# Patient Record
Sex: FEMALE | Race: ASIAN | NOT HISPANIC OR LATINO | Employment: OTHER | URBAN - METROPOLITAN AREA
[De-identification: names, ages, dates, MRNs, and addresses within clinical notes are randomized per-mention and may not be internally consistent; named-entity substitution may affect disease eponyms.]

---

## 2021-12-30 ENCOUNTER — TELEPHONE (OUTPATIENT)
Dept: ORTHOPEDICS | Facility: CLINIC | Age: 71
End: 2021-12-30

## 2022-02-07 ENCOUNTER — OFFICE VISIT (OUTPATIENT)
Dept: INTERNAL MEDICINE | Facility: CLINIC | Age: 72
End: 2022-02-07
Payer: MEDICARE

## 2022-02-07 VITALS
DIASTOLIC BLOOD PRESSURE: 78 MMHG | RESPIRATION RATE: 14 BRPM | SYSTOLIC BLOOD PRESSURE: 128 MMHG | OXYGEN SATURATION: 98 % | HEART RATE: 70 BPM

## 2022-02-07 DIAGNOSIS — Z00.00 HEALTHCARE MAINTENANCE: ICD-10-CM

## 2022-02-07 DIAGNOSIS — I10 HYPERTENSION, UNSPECIFIED TYPE: Primary | ICD-10-CM

## 2022-02-07 PROCEDURE — 99203 OFFICE O/P NEW LOW 30 MIN: CPT | Mod: GC,S$GLB,,

## 2022-02-07 PROCEDURE — 99999 PR PBB SHADOW E&M-EST. PATIENT-LVL III: ICD-10-PCS | Mod: PBBFAC,GC,,

## 2022-02-07 PROCEDURE — 99203 PR OFFICE/OUTPT VISIT, NEW, LEVL III, 30-44 MIN: ICD-10-PCS | Mod: GC,S$GLB,,

## 2022-02-07 PROCEDURE — 99999 PR PBB SHADOW E&M-EST. PATIENT-LVL III: CPT | Mod: PBBFAC,GC,,

## 2022-02-07 RX ORDER — AMLODIPINE BESYLATE 5 MG/1
5 TABLET ORAL DAILY
COMMUNITY
Start: 2022-01-27 | End: 2022-02-07 | Stop reason: SDUPTHER

## 2022-02-07 RX ORDER — AMLODIPINE BESYLATE 5 MG/1
5 TABLET ORAL DAILY
Qty: 90 TABLET | Refills: 1 | Status: SHIPPED | OUTPATIENT
Start: 2022-02-07 | End: 2022-08-01 | Stop reason: SDUPTHER

## 2022-02-07 NOTE — PROGRESS NOTES
"  Subjective     Chief Complaint: "establish care"    History of Present Illness:  Ms. García Mercado is a 71 y.o. female Mrs. Mercado is a 72 y/o F hx of HTN who presents to [facility]. Pt was previously a patient at [facility] for many years. However she recently received new insurance and so could not be seen there anymore. Pt states she is currently in good health. She reports a history of hypertension well controlled on Amlodipine. She checks her BP every day at home. She denies any other current medical problems. Pt has all her records at [facility]; however they do not show up in CareEverywhere.     Pt denies any tobacco, alcohol, or other drug use. She lives at home with her .     Review of Systems   Constitutional: Negative for chills, fever, malaise/fatigue and weight loss.   HENT: Negative for congestion and sore throat.    Eyes: Negative for blurred vision.   Respiratory: Negative for cough and shortness of breath.    Cardiovascular: Negative for chest pain, palpitations and leg swelling.   Gastrointestinal: Negative for abdominal pain, blood in stool, constipation, diarrhea, heartburn, melena, nausea and vomiting.   Genitourinary: Negative for frequency.   Musculoskeletal: Positive for joint pain (L shoulder pain). Negative for myalgias.   Neurological: Negative for dizziness, loss of consciousness, weakness and headaches.   Psychiatric/Behavioral: Negative for depression. The patient is not nervous/anxious.        PAST HISTORY:     Past Medical History:   Diagnosis Date    Hypertension        History reviewed. No pertinent surgical history.    Family History   Problem Relation Age of Onset    Cancer Mother     Hypertension Mother     Diabetes Mother        Social History     Socioeconomic History    Marital status:    Tobacco Use    Smoking status: Never Smoker    Smokeless tobacco: Never Used   Substance and Sexual Activity    Alcohol use: Never    Drug use: Never    Sexual activity: Yes "     Partners: Male       MEDICATIONS & ALLERGIES:     Current Outpatient Medications on File Prior to Visit   Medication Sig    [DISCONTINUED] amLODIPine (NORVASC) 5 MG tablet Take 5 mg by mouth once daily.     No current facility-administered medications on file prior to visit.       Review of patient's allergies indicates:   Allergen Reactions    Shellfish containing products Diarrhea       OBJECTIVE:     Vital Signs:  Vitals:    02/07/22 1610   BP: 128/78   Pulse: 70   Resp: 14   SpO2: 98%       There is no height or weight on file to calculate BMI.     Physical Exam  Vitals and nursing note reviewed.   Constitutional:       General: She is not in acute distress.     Appearance: Normal appearance. She is normal weight. She is not ill-appearing, toxic-appearing or diaphoretic.   HENT:      Head: Normocephalic and atraumatic.      Nose: Nose normal.      Mouth/Throat:      Mouth: Mucous membranes are moist.      Pharynx: Oropharynx is clear.   Eyes:      General: No scleral icterus.     Extraocular Movements: Extraocular movements intact.      Conjunctiva/sclera: Conjunctivae normal.      Pupils: Pupils are equal, round, and reactive to light.   Cardiovascular:      Rate and Rhythm: Normal rate and regular rhythm.      Pulses: Normal pulses.      Heart sounds: Normal heart sounds. No murmur heard.  No friction rub. No gallop.    Pulmonary:      Effort: Pulmonary effort is normal. No respiratory distress.      Breath sounds: Normal breath sounds. No stridor. No wheezing, rhonchi or rales.   Abdominal:      General: Abdomen is flat. Bowel sounds are normal.      Palpations: Abdomen is soft.      Tenderness: There is no abdominal tenderness.   Musculoskeletal:         General: No swelling. Normal range of motion.      Cervical back: Normal range of motion and neck supple.   Skin:     General: Skin is warm and dry.   Neurological:      General: No focal deficit present.      Mental Status: She is alert and oriented  to person, place, and time.   Psychiatric:         Mood and Affect: Mood normal.         Behavior: Behavior normal.         Laboratory  No results found for this or any previous visit (from the past 24 hour(s)).    Diagnostic Results:      Health Maintenance Due   Topic Date Due    Hepatitis C Screening  Never done    Lipid Panel  Never done    TETANUS VACCINE  Never done    Mammogram  Never done    DEXA SCAN  Never done    Colorectal Cancer Screening  Never done    Shingles Vaccine (1 of 2) Never done    Pneumococcal Vaccines (Age 65+) (1 of 1 - PPSV23) Never done         ASSESSMENT & PLAN:     Ms. García Mercado is a 71 y.o. female with hx of hypertension who presents to [facility] care.     Hypertension, unspecified type         - BP well controlled on current regimen. Continue Amlodipine 5mg qd.     Healthcare maintenance         - Pt reports being up to date with screening exams, however I am unable to see her records from [facility]. I will fax a medical records requisition form for her records, in particular for colonoscopy, mammogram, DEXA scan.         - Pt declines TDAP, Pneumococcal, and Shingrix vaccines at this time.         - Pt states she recently had baseline labs drawn at [facility] last year.     Other orders  -     amLODIPine (NORVASC) 5 MG tablet; Take 1 tablet (5 mg total) by mouth once daily.  Dispense: 90 tablet; Refill: 1      RTC in 6 months     Discussed with Dr. Owen  - staff attestation to follow      Skip Ramon MD  Internal Medicine PGY1  [facility]  [address] [place] [address] [id number] [phone]

## 2022-02-07 NOTE — PATIENT INSTRUCTIONS
It was great seeing you in clinic this afternoon.    I will have your records faxed to our office.    I will see you back in clinic in 6 months.

## 2022-02-10 ENCOUNTER — HOSPITAL ENCOUNTER (OUTPATIENT)
Dept: RADIOLOGY | Facility: HOSPITAL | Age: 72
Discharge: HOME | End: 2022-02-10
Attending: PHYSICIAN ASSISTANT
Payer: MEDICARE

## 2022-02-10 ENCOUNTER — OFFICE VISIT (OUTPATIENT)
Dept: ORTHOPEDICS | Facility: CLINIC | Age: 72
End: 2022-02-10
Payer: MEDICARE

## 2022-02-10 VITALS
BODY MASS INDEX: 21.65 KG/M2 | HEART RATE: 92 BPM | TEMPERATURE: 96 F | SYSTOLIC BLOOD PRESSURE: 119 MMHG | WEIGHT: 117.63 LBS | HEIGHT: 62 IN | DIASTOLIC BLOOD PRESSURE: 72 MMHG

## 2022-02-10 DIAGNOSIS — M25.512 ACUTE PAIN OF LEFT SHOULDER: ICD-10-CM

## 2022-02-10 DIAGNOSIS — M25.512 ACUTE PAIN OF LEFT SHOULDER: Primary | ICD-10-CM

## 2022-02-10 PROCEDURE — 99999 PR PBB SHADOW E&M-EST. PATIENT-LVL III: CPT | Mod: PBBFAC,,, | Performed by: PHYSICIAN ASSISTANT

## 2022-02-10 PROCEDURE — 1125F PR PAIN SEVERITY QUANTIFIED, PAIN PRESENT: ICD-10-PCS | Mod: CPTII,S$GLB,, | Performed by: PHYSICIAN ASSISTANT

## 2022-02-10 PROCEDURE — 1100F PTFALLS ASSESS-DOCD GE2>/YR: CPT | Mod: CPTII,S$GLB,, | Performed by: PHYSICIAN ASSISTANT

## 2022-02-10 PROCEDURE — 1160F PR REVIEW ALL MEDS BY PRESCRIBER/CLIN PHARMACIST DOCUMENTED: ICD-10-PCS | Mod: CPTII,S$GLB,, | Performed by: PHYSICIAN ASSISTANT

## 2022-02-10 PROCEDURE — 1159F PR MEDICATION LIST DOCUMENTED IN MEDICAL RECORD: ICD-10-PCS | Mod: CPTII,S$GLB,, | Performed by: PHYSICIAN ASSISTANT

## 2022-02-10 PROCEDURE — 1160F RVW MEDS BY RX/DR IN RCRD: CPT | Mod: CPTII,S$GLB,, | Performed by: PHYSICIAN ASSISTANT

## 2022-02-10 PROCEDURE — 3288F PR FALLS RISK ASSESSMENT DOCUMENTED: ICD-10-PCS | Mod: CPTII,S$GLB,, | Performed by: PHYSICIAN ASSISTANT

## 2022-02-10 PROCEDURE — 3074F PR MOST RECENT SYSTOLIC BLOOD PRESSURE < 130 MM HG: ICD-10-PCS | Mod: CPTII,S$GLB,, | Performed by: PHYSICIAN ASSISTANT

## 2022-02-10 PROCEDURE — 99999 PR PBB SHADOW E&M-EST. PATIENT-LVL III: ICD-10-PCS | Mod: PBBFAC,,, | Performed by: PHYSICIAN ASSISTANT

## 2022-02-10 PROCEDURE — 73030 X-RAY EXAM OF SHOULDER: CPT | Mod: TC,LT

## 2022-02-10 PROCEDURE — 3078F DIAST BP <80 MM HG: CPT | Mod: CPTII,S$GLB,, | Performed by: PHYSICIAN ASSISTANT

## 2022-02-10 PROCEDURE — 3008F PR BODY MASS INDEX (BMI) DOCUMENTED: ICD-10-PCS | Mod: CPTII,S$GLB,, | Performed by: PHYSICIAN ASSISTANT

## 2022-02-10 PROCEDURE — 99203 OFFICE O/P NEW LOW 30 MIN: CPT | Mod: S$GLB,,, | Performed by: PHYSICIAN ASSISTANT

## 2022-02-10 PROCEDURE — 73030 X-RAY EXAM OF SHOULDER: CPT | Mod: 26,LT,, | Performed by: RADIOLOGY

## 2022-02-10 PROCEDURE — 3078F PR MOST RECENT DIASTOLIC BLOOD PRESSURE < 80 MM HG: ICD-10-PCS | Mod: CPTII,S$GLB,, | Performed by: PHYSICIAN ASSISTANT

## 2022-02-10 PROCEDURE — 3288F FALL RISK ASSESSMENT DOCD: CPT | Mod: CPTII,S$GLB,, | Performed by: PHYSICIAN ASSISTANT

## 2022-02-10 PROCEDURE — 1125F AMNT PAIN NOTED PAIN PRSNT: CPT | Mod: CPTII,S$GLB,, | Performed by: PHYSICIAN ASSISTANT

## 2022-02-10 PROCEDURE — 3074F SYST BP LT 130 MM HG: CPT | Mod: CPTII,S$GLB,, | Performed by: PHYSICIAN ASSISTANT

## 2022-02-10 PROCEDURE — 99203 PR OFFICE/OUTPT VISIT, NEW, LEVL III, 30-44 MIN: ICD-10-PCS | Mod: S$GLB,,, | Performed by: PHYSICIAN ASSISTANT

## 2022-02-10 PROCEDURE — 3008F BODY MASS INDEX DOCD: CPT | Mod: CPTII,S$GLB,, | Performed by: PHYSICIAN ASSISTANT

## 2022-02-10 PROCEDURE — 73030 XR SHOULDER COMPLETE 2 OR MORE VIEWS LEFT: ICD-10-PCS | Mod: 26,LT,, | Performed by: RADIOLOGY

## 2022-02-10 PROCEDURE — 1100F PR PT FALLS ASSESS DOC 2+ FALLS/FALL W/INJURY/YR: ICD-10-PCS | Mod: CPTII,S$GLB,, | Performed by: PHYSICIAN ASSISTANT

## 2022-02-10 PROCEDURE — 1159F MED LIST DOCD IN RCRD: CPT | Mod: CPTII,S$GLB,, | Performed by: PHYSICIAN ASSISTANT

## 2022-02-11 ENCOUNTER — PATIENT MESSAGE (OUTPATIENT)
Dept: ORTHOPEDICS | Facility: CLINIC | Age: 72
End: 2022-02-11
Payer: MEDICARE

## 2022-02-23 ENCOUNTER — PATIENT OUTREACH (OUTPATIENT)
Dept: ADMINISTRATIVE | Facility: HOSPITAL | Age: 72
End: 2022-02-23
Payer: MEDICARE

## 2022-02-23 NOTE — PROGRESS NOTES
Health Maintenance Due   Topic Date Due    Hepatitis C Screening  Never done    Lipid Panel  Never done    Mammogram  Never done    DEXA Scan  Never done    Colorectal Cancer Screening  Never done    Shingles Vaccine (1 of 2) Never done    TETANUS VACCINE  11/20/2007    Pneumococcal Vaccines (Age 65+) (1 of 1 - PPSV23) Never done     Triggered LINKS & reconciled immunizations.  Updated Care Everywhere.  Scanned in outside lab results into chart.  Chart review completed.

## 2022-03-10 ENCOUNTER — CLINICAL SUPPORT (OUTPATIENT)
Dept: REHABILITATION | Facility: HOSPITAL | Age: 72
End: 2022-03-10
Payer: MEDICARE

## 2022-03-10 DIAGNOSIS — R68.89 IMPAIRED FUNCTION OF UPPER EXTREMITY: ICD-10-CM

## 2022-03-10 DIAGNOSIS — M25.612 IMPAIRED RANGE OF MOTION OF LEFT SHOULDER: ICD-10-CM

## 2022-03-10 DIAGNOSIS — R29.898 DECREASED STRENGTH OF UPPER EXTREMITY: ICD-10-CM

## 2022-03-10 PROCEDURE — 97161 PT EVAL LOW COMPLEX 20 MIN: CPT | Mod: PO | Performed by: PHYSICAL THERAPIST

## 2022-03-10 NOTE — PLAN OF CARE
[facility]   Physical Therapy Initial Evaluation     Date: 3/10/2022   Name: García Mercado  Clinic Number: [id number]    Therapy Diagnosis:   Encounter Diagnoses   Name Primary?    Impaired range of motion of left shoulder     Impaired function of upper extremity     Decreased strength of upper extremity      Physician: Brady Ravi PA*    Physician Orders: PT Eval and Treat left shoulder  Medical Diagnosis from Referral:   Acute pain of left shoulder [M25.512]    Evaluation Date: 3/10/2022  Authorization Period Expiration: 3//24/2022  Plan of Care Expiration: 6/10/2022  Progress Note Due: 4/10/2022  Visit # / Visits authorized: 1/ 1   FOTO: 1/ 3     Precautions: Standard    Time In: 1455  Time Out: 1545  Total Appointment Time (timed & untimed codes): 50 minutes      SUBJECTIVE       History of current condition: García reports constant pain left shoulder.       Date of onset: 12/24/2021. Patient fell and landed on her outstretched arm in her home. She says that she tripped over her sandle in the AM when first up to walk.   There was immediate pain. The pain improved but persisted. Consulted MD seven weeks later. Presently the pain has improved.and not as intense.   Falls: x1  Pain:  Current 5/10, worst 5/10, best 3/10   Location: left shoulder, anterior and posterior aspects    Description: Sharp  Aggravating Factors: reaching behind her back   Easing Factors: holding the arm close to her side   Sleep: not disturbed     Current Level of Function:   Personal - dressing to put left arm in the sleeve.   Domestic - not limited.   Community - not limited   Occupation - NA   Sports/recreation/fitness - not limited. Patient only walks.   Prior Level of Function: no limitations   Prior Therapy: no   Social History:  lives with their spouse in a single family home.   Occupation: retired     Patients goals: to move the arm better without pain  Imaging, X-Rays 2/10/2022:     Shoulder  complete three views left: There is baseline DJD.  No fracture dislocation bone destruction seen.  No fracture dislocation bone destruction seen        Medical History:   Past Medical History:   Diagnosis Date    Hypertension        Surgical History:   García Mercado  has no past surgical history on file.    Medications:   García has a current medication list which includes the following prescription(s): amlodipine.    Allergies:   Review of patient's allergies indicates:   Allergen Reactions    Shellfish containing products Diarrhea          OBJECTIVE         Objective     Posture: slight forward head, left scapula prominent vertebral border    Scapular Control/Dyskinesis:    Normal / Subtle / Obvious   Left Obvious, abd w/IR, forwared raise     Right Normal      Dermatomes: intact   Palpation: pain left RTC attachment.   Selective Functional Movement Assessment:  FN: functional, non-painful  FP: functional, painful  DP: dysfunctional, painful  DN: dysfunctional, non-painful    Active Cervical Flexion: DN  Active Cervical Extension: DN  Cervical Rotation:  Right:DN  Left: DN  Upper extremity pattern 1:  Right: FN  Left:FP  Upper extremity pattern 2:  Right:FN  Left:DP    Shoulder Range of Motion:   Shoulder  Left  Pain/Dysfunction with Movement    AROM PROM MMT    flexion 120p 145p 3+/5    extension 35p 50p 3+/5    abduction 90p 130p 3+/5p    Internal rotation 60 70 4-/5    ER at 90° abd 50p 70p 3+/5    ER at 0° abd 60p 70p 4-/5        STRENGTH LEFT   Supraspinatus 3+/5      Subscapularis  4-/5       Teres minor  3+/5        Infraspinatus   4-/5                                                            Left   Empty Can (Supraspinatus) Pos    Sifuentes [place] Pos    Crossover Impingment Neg    Lift Off (Subscap) Pos    Belly Press (Subscap) Neg    Speeds Sign  Neg    O'Briens (Labrum) Pos            Limitation/Restriction for FOTO shoulder IE Survey    Therapist reviewed FOTO scores for García Mercado on 3/10/2022.   FOTO  documents entered into EPIC - see Media section.    Limitation Score: 50%         TREATMENT     Total Treatment time (time-based codes) separate from Evaluation: 0 minutes       PATIENT EDUCATION AND HOME EXERCISES     Education provided:   - none     Written Home Exercises Provided: no.     ASSESSMENT     García is a 71 y.o. female referred to outpatient Physical Therapy with a medical diagnosis of  Acute pain of left shoulder   Patient presents with clinical findings of shoulder flexion abduction pain dysfunction, shoulder external rotation pain dysfunction, shoulder extension pain dysfunction. There is corresponding dysfunctional scapula mobility patterns. Clinical testing demonstrated positive impingement signs along possible labral signs and pain from the RTC subscapularis and supraspinatus, teres minor     Patient prognosis is Good.   Patientt will benefit from skilled outpatient Physical Therapy to address the deficits stated above and in the chart below, provide patient /family education, and to maximize patientt's level of independence.     Plan of care discussed with patient: Yes  Patient's spiritual, cultural and educational needs considered and patient is agreeable to the plan of care and goals as stated below:     Anticipated Barriers for therapy: none     Medical Necessity is demonstrated by the following  History  Co-morbidities and personal factors that may impact the plan of care Co-morbidities:   Past Medical History:   Diagnosis Date    Hypertension        Personal Factors:   no deficits     low   Examination  Body Structures and Functions, activity limitations and participation restrictions that may impact the plan of care Body Regions:   left shoulder    Body Systems:    musculoskeletal    Participation Restrictions:   None    Activity limitations:   Learning and applying knowledge  no deficits    General Tasks and Commands  no deficits    Communication  no deficits    Mobility  reaching behind  the back and overhead    Self care  dressing    Domestic Life  no deficits    Interactions/Relationships  no deficits    Life Areas  employment  no deficits    Community and Social Life  community life  recreation and leisure         low   Clinical Presentation stable and uncomplicated low   Decision Making/ Complexity Score: low     Goals:  Shoulder goals to be met:  Short Term Goals: 5 weeks   1. Pts pain intensity reduced 20 - 40% for improved quality of life  ONGOING  2. Pt to demonstrate functional shoulder pattern 1 (extension, IR,horiz add) w/o pain for improved functional mobility ONGOING  3. Pt to increase strength by 1/2 grade in all glenohumeral and periscapular muscles for improved functional mobility. ONGOING  4. Pt to to exhibit correct exercise patterns of movement for return demonstration of HEP ONGOING  5. Pt to demonstrate shoulder pattern 2 w/o pain for restoring functional movement pattern ONGOING    Long Term Goals: 10 weeks   1. Pt to perform UE pattern 2 movement to achieve functional overhead reach without pain  ONGOING  2. Pt to achieve increase active shoulder mobility by 15 to 20 degrees in flexion, abduction, extension and external rotation to restore functional mobility pattern and shoulder movement behind the back and overhead. ONGOING  3. Pt to achieve increased thoracic extension / rotation to 50 degrees mobility to enhance functional shoulder mobility ONGOING  4. Pt to demonstrate independence with HEP for self management. ONGOING  5. Pt to achieve increased glenohumeral and periscapular muscle strength by one grade to restore functional left UE mobility. ONGOING    PLAN   Plan of care Certification: 3/10/2022 to 6/10/2022.    Outpatient Physical Therapy 2 times weekly for 10 weeks to include the following interventions: Manual Therapy, Neuromuscular Re-ed, Patient Education and Therapeutic Exercise. Modalities PRADRIA Castelan, PT      I CERTIFY THE NEED FOR THESE SERVICES  FURNISHED UNDER THIS PLAN OF TREATMENT AND WHILE UNDER MY CARE   Physician's comments:     Physician's Signature: ___________________________________________________

## 2022-03-10 NOTE — PROGRESS NOTES
[facility]   Physical Therapy Initial Evaluation     Date: 3/10/2022   Name: García Mercado  Clinic Number: [id number]    Therapy Diagnosis:   Encounter Diagnoses   Name Primary?    Impaired range of motion of left shoulder     Impaired function of upper extremity     Decreased strength of upper extremity      Physician: Brady Ravi PA*    Physician Orders: PT Eval and Treat left shoulder  Medical Diagnosis from Referral:   Acute pain of left shoulder [M25.512]    Evaluation Date: 3/10/2022  Authorization Period Expiration: 3//24/2022  Plan of Care Expiration: 6/10/2022  Progress Note Due: 4/10/2022  Visit # / Visits authorized: 1/ 1   FOTO: 1/ 3     Precautions: Standard    Time In: 1455  Time Out: 1545  Total Appointment Time (timed & untimed codes): 50 minutes      SUBJECTIVE       History of current condition: García reports constant pain left shoulder.       Date of onset: 12/24/2021. Patient fell and landed on her outstretched arm in her home. She says that she tripped over her sandle in the AM when first up to walk.   There was immediate pain. The pain improved but persisted. Consulted MD seven weeks later. Presently the pain has improved.and not as intense.   Falls: x1  Pain:  Current 5/10, worst 5/10, best 3/10   Location: left shoulder, anterior and posterior aspects    Description: Sharp  Aggravating Factors: reaching behind her back   Easing Factors: holding the arm close to her side   Sleep: not disturbed     Current Level of Function:   Personal - dressing to put left arm in the sleeve.   Domestic - not limited.   Community - not limited   Occupation - NA   Sports/recreation/fitness - not limited. Patient only walks.   Prior Level of Function: no limitations   Prior Therapy: no   Social History:  lives with their spouse in a single family home.   Occupation: retired     Patients goals: to move the arm better without pain  Imaging, X-Rays 2/10/2022:     Shoulder  complete three views left: There is baseline DJD.  No fracture dislocation bone destruction seen.  No fracture dislocation bone destruction seen        Medical History:   Past Medical History:   Diagnosis Date    Hypertension        Surgical History:   García Mercado  has no past surgical history on file.    Medications:   García has a current medication list which includes the following prescription(s): amlodipine.    Allergies:   Review of patient's allergies indicates:   Allergen Reactions    Shellfish containing products Diarrhea          OBJECTIVE         Objective     Posture: slight forward head, left scapula prominent vertebral border    Scapular Control/Dyskinesis:    Normal / Subtle / Obvious   Left Obvious, abd w/IR, forwared raise     Right Normal      Dermatomes: intact   Palpation: pain left RTC attachment.   Selective Functional Movement Assessment:  FN: functional, non-painful  FP: functional, painful  DP: dysfunctional, painful  DN: dysfunctional, non-painful    Active Cervical Flexion: DN  Active Cervical Extension: DN  Cervical Rotation:  Right:DN  Left: DN  Upper extremity pattern 1:  Right: FN  Left:FP  Upper extremity pattern 2:  Right:FN  Left:DP    Shoulder Range of Motion:   Shoulder  Left  Pain/Dysfunction with Movement    AROM PROM MMT    flexion 120p 145p 3+/5    extension 35p 50p 3+/5    abduction 90p 130p 3+/5p    Internal rotation 60 70 4-/5    ER at 90° abd 50p 70p 3+/5    ER at 0° abd 60p 70p 4-/5        STRENGTH LEFT   Supraspinatus 3+/5      Subscapularis  4-/5       Teres minor  3+/5        Infraspinatus   4-/5                                                            Left   Empty Can (Supraspinatus) Pos    Sifuentes [place] Pos    Crossover Impingment Neg    Lift Off (Subscap) Pos    Belly Press (Subscap) Neg    Speeds Sign  Neg    O'Briens (Labrum) Pos            Limitation/Restriction for FOTO shoulder IE Survey    Therapist reviewed FOTO scores for García Mercado on 3/10/2022.   FOTO  documents entered into EPIC - see Media section.    Limitation Score: 50%         TREATMENT     Total Treatment time (time-based codes) separate from Evaluation: 0 minutes       PATIENT EDUCATION AND HOME EXERCISES     Education provided:   - none     Written Home Exercises Provided: no.     ASSESSMENT     García is a 71 y.o. female referred to outpatient Physical Therapy with a medical diagnosis of  Acute pain of left shoulder   Patient presents with clinical findings of shoulder flexion abduction pain dysfunction, shoulder external rotation pain dysfunction, shoulder extension pain dysfunction. There is corresponding dysfunctional scapula mobility patterns. Clinical testing demonstrated positive impingement signs along possible labral signs and pain from the RTC subscapularis and supraspinatus, teres minor     Patient prognosis is Good.   Patientt will benefit from skilled outpatient Physical Therapy to address the deficits stated above and in the chart below, provide patient /family education, and to maximize patientt's level of independence.     Plan of care discussed with patient: Yes  Patient's spiritual, cultural and educational needs considered and patient is agreeable to the plan of care and goals as stated below:     Anticipated Barriers for therapy: none     Medical Necessity is demonstrated by the following  History  Co-morbidities and personal factors that may impact the plan of care Co-morbidities:   Past Medical History:   Diagnosis Date    Hypertension        Personal Factors:   no deficits     low   Examination  Body Structures and Functions, activity limitations and participation restrictions that may impact the plan of care Body Regions:   left shoulder    Body Systems:    musculoskeletal    Participation Restrictions:   None    Activity limitations:   Learning and applying knowledge  no deficits    General Tasks and Commands  no deficits    Communication  no deficits    Mobility  reaching behind  the back and overhead    Self care  dressing    Domestic Life  no deficits    Interactions/Relationships  no deficits    Life Areas  employment  no deficits    Community and Social Life  community life  recreation and leisure         low   Clinical Presentation stable and uncomplicated low   Decision Making/ Complexity Score: low     Goals:  Shoulder goals to be met:  Short Term Goals: 5 weeks   1. Pts pain intensity reduced 20 - 40% for improved quality of life  ONGOING  2. Pt to demonstrate functional shoulder pattern 1 (extension, IR,horiz add) w/o pain for improved functional mobility ONGOING  3. Pt to increase strength by 1/2 grade in all glenohumeral and periscapular muscles for improved functional mobility. ONGOING  4. Pt to to exhibit correct exercise patterns of movement for return demonstration of HEP ONGOING  5. Pt to demonstrate shoulder pattern 2 w/o pain for restoring functional movement pattern ONGOING    Long Term Goals: 10 weeks   1. Pt to perform UE pattern 2 movement to achieve functional overhead reach without pain  ONGOING  2. Pt to achieve increase active shoulder mobility by 15 to 20 degrees in flexion, abduction, extension and external rotation to restore functional mobility pattern and shoulder movement behind the back and overhead. ONGOING  3. Pt to achieve increased thoracic extension / rotation to 50 degrees mobility to enhance functional shoulder mobility ONGOING  4. Pt to demonstrate independence with HEP for self management. ONGOING  5. Pt to achieve increased glenohumeral and periscapular muscle strength by one grade to restore functional left UE mobility. ONGOING    PLAN   Plan of care Certification: 3/10/2022 to 6/10/2022.    Outpatient Physical Therapy 2 times weekly for 10 weeks to include the following interventions: Manual Therapy, Neuromuscular Re-ed, Patient Education and Therapeutic Exercise. Modalities PRADRIA Castelan, PT      I CERTIFY THE NEED FOR THESE SERVICES  FURNISHED UNDER THIS PLAN OF TREATMENT AND WHILE UNDER MY CARE   Physician's comments:     Physician's Signature: ___________________________________________________

## 2022-03-16 ENCOUNTER — CLINICAL SUPPORT (OUTPATIENT)
Dept: REHABILITATION | Facility: HOSPITAL | Age: 72
End: 2022-03-16
Payer: MEDICARE

## 2022-03-16 DIAGNOSIS — R29.898 DECREASED STRENGTH OF UPPER EXTREMITY: ICD-10-CM

## 2022-03-16 DIAGNOSIS — R68.89 IMPAIRED FUNCTION OF UPPER EXTREMITY: ICD-10-CM

## 2022-03-16 DIAGNOSIS — M25.612 IMPAIRED RANGE OF MOTION OF LEFT SHOULDER: Primary | ICD-10-CM

## 2022-03-16 PROCEDURE — 97110 THERAPEUTIC EXERCISES: CPT | Mod: PO

## 2022-03-16 NOTE — PROGRESS NOTES
"  Physical Therapy Treatment Note     Name: García Mercado  Clinic Number: [id number]    Therapy Diagnosis:   Encounter Diagnoses   Name Primary?    Impaired range of motion of left shoulder Yes    Impaired function of upper extremity     Decreased strength of upper extremity      Physician: Brady Ravi PA*    Visit Date: 3/16/2022  Physician Orders: PT Eval and Treat left shoulder  Medical Diagnosis from Referral:   Acute pain of left shoulder [M25.512]     Evaluation Date: 3/10/2022  Authorization Period Expiration: 3//24/2022  Plan of Care Expiration: 6/10/2022  Progress Note Due: 4/10/2022  Visit # / Visits authorized: 2/5  FOTO: 1/ 3        Time In: 1447  Time Out: 1530  Total Billable Time: 48 minutes    Precautions: Standard    Subjective     Pt reports: "its still the same."  She was compliant with home exercise program.  Response to previous treatment: IE completed  Functional change: ongoing    Pain: 3/10  Location: left shoulder     Objective     García received therapeutic exercises to develop strength, endurance, ROM, flexibility and posture for 38 minutes including:  +UBE fwd/bkwd x5mins total. L1  +pulleys for flexion in sitting x5mins  +supine cane B shoulder flexion 10x10   +Sitting table slides for flexion and ABD 10x10  +Landmines using dowel, turned slightly into scaption x30  +AAROM supine flexion with dowel x20, able to complete w/B arms in ER (thumbs out)  +shoulder rolls bkwds x20      García received cold pack for 10 minutes to L shoulder to decrease pain and inflammation.      Home Exercises Provided and Patient Education Provided     Education provided:   - cueing and demo     Written Home Exercises Provided: Patient instructed to cont prior HEP.  Exercises were reviewed and García was able to demonstrate them prior to the end of the session.  García demonstrated good  understanding of the education provided.     See EMR under Patient Instructions for exercises provided prior " visit.    Assessment   Pt with some discomfort during a few of today's activities. Attempted to decrease this pain with positioning and posture changes as able. She continues to show a lot of upper trap compensation likely increasing the shoulder impingement she came to therapy with.     García Is progressing well towards her goals.   Pt prognosis is Good.     Pt will continue to benefit from skilled outpatient physical therapy to address the deficits listed in the problem list box on initial evaluation, provide pt/family education and to maximize pt's level of independence in the home and community environment.     Pt's spiritual, cultural and educational needs considered and pt agreeable to plan of care and goals.     Anticipated barriers to physical therapy: none    Goals:  Shoulder goals to be met:  Short Term Goals: 5 weeks   1. Pts pain intensity reduced 20 - 40% for improved quality of life  ONGOING  2. Pt to demonstrate functional shoulder pattern 1 (extension, IR,horiz add) w/o pain for improved functional mobility ONGOING  3. Pt to increase strength by 1/2 grade in all glenohumeral and periscapular muscles for improved functional mobility. ONGOING  4. Pt to to exhibit correct exercise patterns of movement for return demonstration of HEP ONGOING  5. Pt to demonstrate shoulder pattern 2 w/o pain for restoring functional movement pattern ONGOING     Long Term Goals: 10 weeks   1. Pt to perform UE pattern 2 movement to achieve functional overhead reach without pain  ONGOING  2. Pt to achieve increase active shoulder mobility by 15 to 20 degrees in flexion, abduction, extension and external rotation to restore functional mobility pattern and shoulder movement behind the back and overhead. ONGOING  3. Pt to achieve increased thoracic extension / rotation to 50 degrees mobility to enhance functional shoulder mobility ONGOING  4. Pt to demonstrate independence with HEP for self management. ONGOING  5. Pt to achieve  increased glenohumeral and periscapular muscle strength by one grade to restore functional left UE mobility. ONGOING     PLAN   Plan of care Certification: 3/10/2022 to 6/10/2022.     Outpatient Physical Therapy 2 times weekly for 10 weeks to include the following interventions: Manual Therapy, Neuromuscular Re-ed, Patient Education and Therapeutic Exercise. Modalities PRN    Camryn Blackman, PT

## 2022-03-25 ENCOUNTER — CLINICAL SUPPORT (OUTPATIENT)
Dept: REHABILITATION | Facility: HOSPITAL | Age: 72
End: 2022-03-25
Payer: MEDICARE

## 2022-03-25 DIAGNOSIS — R29.898 DECREASED STRENGTH OF UPPER EXTREMITY: ICD-10-CM

## 2022-03-25 DIAGNOSIS — R68.89 IMPAIRED FUNCTION OF UPPER EXTREMITY: ICD-10-CM

## 2022-03-25 DIAGNOSIS — M25.612 IMPAIRED RANGE OF MOTION OF LEFT SHOULDER: Primary | ICD-10-CM

## 2022-03-25 PROCEDURE — 97110 THERAPEUTIC EXERCISES: CPT | Mod: PO,CQ

## 2022-03-25 NOTE — PROGRESS NOTES
"  Physical Therapy Treatment Note     Name: García Mercado  Clinic Number: [id number]    Therapy Diagnosis:   Encounter Diagnoses   Name Primary?    Impaired range of motion of left shoulder Yes    Impaired function of upper extremity     Decreased strength of upper extremity      Physician: Brady Ravi PA*    Visit Date: 3/25/2022  Physician Orders: PT Eval and Treat left shoulder  Medical Diagnosis from Referral:   Acute pain of left shoulder [M25.512]     Evaluation Date: 3/10/2022  Authorization Period Expiration: 3//24/2022  Plan of Care Expiration: 6/10/2022  Progress Note Due: 4/10/2022  Visit # / Visits authorized: 2/5  FOTO: 1/ 3        Time In: 1545  Time Out: 1630  Total Billable Time: 45 minutes    Precautions: Standard    Subjective     Pt reports: she still has L shoulder pain.   She was compliant with home exercise program.  Response to previous treatment: Felt fine.   Functional change: ongoing    Pain: did not rate /10  Location: left shoulder     Objective     García received therapeutic exercises to develop strength, endurance, ROM, flexibility and posture for 38 minutes including:    +UBE fwd/bkwd x6mins total. L1  +pulleys for flexion in sitting x5mins  +supine cane B shoulder flexion 10x10   +Sitting table slides for flexion and ABD 10x10  +Landmines using dowel, turned slightly into scaption x30  +AAROM supine flexion with dowel x20, able to complete w/B arms in ER (thumbs out)  +shoulder rolls bkwds x20   Seated scap squeeze's x 20 3"   Supine serratus punches x 20   Supine horizontal abduction YTB x 20           Home Exercises Provided and Patient Education Provided     Education provided:   - cueing and demo     Written Home Exercises Provided: Patient instructed to cont prior HEP.  Exercises were reviewed and García was able to demonstrate them prior to the end of the session.  García demonstrated good  understanding of the education provided.     See EMR under Patient Instructions for " exercises provided prior visit.    Assessment   New exercises were added to pt's therapeutic exercise regimen.  Pt was able to complete the above exercises with reports of increased pain.  Pt still has yet to see any change's in her shoulder pain since beginning PT. Will continue to monitor and progress pt within her tolerance.      García Is progressing well towards her goals.   Pt prognosis is Good.     Pt will continue to benefit from skilled outpatient physical therapy to address the deficits listed in the problem list box on initial evaluation, provide pt/family education and to maximize pt's level of independence in the home and community environment.     Pt's spiritual, cultural and educational needs considered and pt agreeable to plan of care and goals.     Anticipated barriers to physical therapy: none    Goals:  Shoulder goals to be met:  Short Term Goals: 5 weeks   1. Pts pain intensity reduced 20 - 40% for improved quality of life  ONGOING  2. Pt to demonstrate functional shoulder pattern 1 (extension, IR,horiz add) w/o pain for improved functional mobility ONGOING  3. Pt to increase strength by 1/2 grade in all glenohumeral and periscapular muscles for improved functional mobility. ONGOING  4. Pt to to exhibit correct exercise patterns of movement for return demonstration of HEP ONGOING  5. Pt to demonstrate shoulder pattern 2 w/o pain for restoring functional movement pattern ONGOING     Long Term Goals: 10 weeks   1. Pt to perform UE pattern 2 movement to achieve functional overhead reach without pain  ONGOING  2. Pt to achieve increase active shoulder mobility by 15 to 20 degrees in flexion, abduction, extension and external rotation to restore functional mobility pattern and shoulder movement behind the back and overhead. ONGOING  3. Pt to achieve increased thoracic extension / rotation to 50 degrees mobility to enhance functional shoulder mobility ONGOING  4. Pt to demonstrate independence with HEP  for self management. ONGOING  5. Pt to achieve increased glenohumeral and periscapular muscle strength by one grade to restore functional left UE mobility. ONGOING     PLAN   Plan of care Certification: 3/10/2022 to 6/10/2022.     Outpatient Physical Therapy 2 times weekly for 10 weeks to include the following interventions: Manual Therapy, Neuromuscular Re-ed, Patient Education and Therapeutic Exercise. Modalities PRN    Brown Hernandez, PTA

## 2022-03-31 ENCOUNTER — CLINICAL SUPPORT (OUTPATIENT)
Dept: REHABILITATION | Facility: HOSPITAL | Age: 72
End: 2022-03-31
Payer: MEDICARE

## 2022-03-31 DIAGNOSIS — M25.612 IMPAIRED RANGE OF MOTION OF LEFT SHOULDER: Primary | ICD-10-CM

## 2022-03-31 DIAGNOSIS — R29.898 DECREASED STRENGTH OF UPPER EXTREMITY: ICD-10-CM

## 2022-03-31 DIAGNOSIS — R68.89 IMPAIRED FUNCTION OF UPPER EXTREMITY: ICD-10-CM

## 2022-03-31 PROCEDURE — 97110 THERAPEUTIC EXERCISES: CPT | Mod: PO

## 2022-03-31 NOTE — PROGRESS NOTES
Physical Therapy Treatment Note     Name: García Mercado  Clinic Number: [id number]    Therapy Diagnosis:   Encounter Diagnoses   Name Primary?    Impaired range of motion of left shoulder Yes    Impaired function of upper extremity     Decreased strength of upper extremity      Physician: Brady Ravi PA*    Visit Date: 3/31/2022  Physician Orders: PT Eval and Treat left shoulder  Medical Diagnosis from Referral:   Acute pain of left shoulder [M25.512]     Evaluation Date: 3/10/2022  Authorization Period Expiration: 5/10/2022  Plan of Care Expiration: 6/10/2022  Progress Note Due: 4/10/2022  Visit # / Visits authorized: 4/7  FOTO: 1/ 3        Time In: 1530  Time Out: 1615  Total Billable Time: 45 minutes    Precautions: Standard    Subjective     Pt reports: the shoulder feels the same.   She was compliant with home exercise program.  Response to previous treatment: Felt fine.   Functional change: ongoing    Pain: 2 /10  Location: left shoulder     Objective     García received therapeutic exercises to develop strength, endurance, ROM, flexibility and posture for 45 minutes including:    UBE fwd/bkwd x8mins total L1.5  +serratus wall slides 3x10  pulleys for flexion in sitting x5mins  supine cane B shoulder flexion 10x10   Sitting table slides for flexion and ABD 10x10  Landmines using dowel, turned slightly into scaption x30  AAROM supine flexion with dowel x20, able to complete w/B arms in ER (thumbs out)  shoulder rolls bkwds x20   Supine serratus punches x 20   NP- Supine horizontal abduction YTB x 20            Home Exercises Provided and Patient Education Provided     Education provided:   - cueing and demo     Written Home Exercises Provided: Patient instructed to cont prior HEP.  Exercises were reviewed and García was able to demonstrate them prior to the end of the session.  García demonstrated good  understanding of the education provided.     See EMR under Patient Instructions for exercises provided  prior visit.    Assessment   Pt tolerated session well.   Able to properly scap retract during serratus wall slides and noted fatigue in her shoulders following this exercise.     García Is progressing well towards her goals.   Pt prognosis is Good.     Pt will continue to benefit from skilled outpatient physical therapy to address the deficits listed in the problem list box on initial evaluation, provide pt/family education and to maximize pt's level of independence in the home and community environment.     Pt's spiritual, cultural and educational needs considered and pt agreeable to plan of care and goals.     Anticipated barriers to physical therapy: none    Goals:  Shoulder goals to be met:  Short Term Goals: 5 weeks   1. Pts pain intensity reduced 20 - 40% for improved quality of life  ONGOING  2. Pt to demonstrate functional shoulder pattern 1 (extension, IR,horiz add) w/o pain for improved functional mobility ONGOING  3. Pt to increase strength by 1/2 grade in all glenohumeral and periscapular muscles for improved functional mobility. ONGOING  4. Pt to to exhibit correct exercise patterns of movement for return demonstration of HEP ONGOING  5. Pt to demonstrate shoulder pattern 2 w/o pain for restoring functional movement pattern ONGOING     Long Term Goals: 10 weeks   1. Pt to perform UE pattern 2 movement to achieve functional overhead reach without pain  ONGOING  2. Pt to achieve increase active shoulder mobility by 15 to 20 degrees in flexion, abduction, extension and external rotation to restore functional mobility pattern and shoulder movement behind the back and overhead. ONGOING  3. Pt to achieve increased thoracic extension / rotation to 50 degrees mobility to enhance functional shoulder mobility ONGOING  4. Pt to demonstrate independence with HEP for self management. ONGOING  5. Pt to achieve increased glenohumeral and periscapular muscle strength by one grade to restore functional left UE mobility.  ONGOING     PLAN   Plan of care Certification: 3/10/2022 to 6/10/2022.     Outpatient Physical Therapy 2 times weekly for 10 weeks to include the following interventions: Manual Therapy, Neuromuscular Re-ed, Patient Education and Therapeutic Exercise. Modalities EM Blackman, PT

## 2022-04-04 ENCOUNTER — CLINICAL SUPPORT (OUTPATIENT)
Dept: REHABILITATION | Facility: HOSPITAL | Age: 72
End: 2022-04-04
Payer: MEDICARE

## 2022-04-04 DIAGNOSIS — R68.89 IMPAIRED FUNCTION OF UPPER EXTREMITY: ICD-10-CM

## 2022-04-04 DIAGNOSIS — R29.898 DECREASED STRENGTH OF UPPER EXTREMITY: ICD-10-CM

## 2022-04-04 DIAGNOSIS — M25.612 IMPAIRED RANGE OF MOTION OF LEFT SHOULDER: Primary | ICD-10-CM

## 2022-04-04 PROCEDURE — 97110 THERAPEUTIC EXERCISES: CPT | Mod: PO,CQ

## 2022-04-04 NOTE — PROGRESS NOTES
Physical Therapy Treatment Note     Name: García Mercado  Clinic Number: [id number]    Therapy Diagnosis:   Encounter Diagnoses   Name Primary?    Impaired range of motion of left shoulder Yes    Impaired function of upper extremity     Decreased strength of upper extremity      Physician: Brady Ravi PA*    Visit Date: 4/4/2022  Physician Orders: PT Eval and Treat left shoulder  Medical Diagnosis from Referral:   Acute pain of left shoulder [M25.512]     Evaluation Date: 3/10/2022  Authorization Period Expiration: 5/10/2022  Plan of Care Expiration: 6/10/2022  Progress Note Due: 4/10/2022  Visit # / Visits authorized: 4/7  FOTO: 1/ 3        Time In: 1430  Time Out: 1510  Total Billable Time: 40 minutes    Precautions: Standard    Subjective     Pt reports: the shoulder is feeling a little bit better.    She was compliant with home exercise program.  Response to previous treatment: Felt fine.   Functional change: ongoing    Pain: did not rate /10  Location: left shoulder     Objective     García received therapeutic exercises to develop strength, endurance, ROM, flexibility and posture for 45 minutes including:    UBE fwd/bkwd x8mins total L1.5  serratus wall slides 3x10  pulleys for flexion in sitting x 5 mins  supine cane B shoulder flexion 10x10   Sitting table slides for flexion and ABD 10x10  Landmines using dowel, turned slightly into scaption x30  AAROM supine flexion with dowel x20, able to complete w/B arms in ER (thumbs out)  shoulder rolls bkwds x 30   Supine serratus punches x 30       NP- Supine horizontal abduction YTB x 20            Home Exercises Provided and Patient Education Provided     Education provided:   - cueing and demo     Written Home Exercises Provided: Patient instructed to cont prior HEP.  Exercises were reviewed and García was able to demonstrate them prior to the end of the session.  García demonstrated good  understanding of the education provided.     See EMR under Patient  Instructions for exercises provided prior visit.    Assessment   Pt with noted improvements in her left shoulder pain this afternoon prior to beginning PT treatment.  Pt performed the above exercises with good tolerance requiring some minor cueing on proper exercise form.  Will continue to monitor and progress pt within her tolerance.     García Is progressing well towards her goals.   Pt prognosis is Good.     Pt will continue to benefit from skilled outpatient physical therapy to address the deficits listed in the problem list box on initial evaluation, provide pt/family education and to maximize pt's level of independence in the home and community environment.     Pt's spiritual, cultural and educational needs considered and pt agreeable to plan of care and goals.     Anticipated barriers to physical therapy: none    Goals:  Shoulder goals to be met:  Short Term Goals: 5 weeks   1. Pts pain intensity reduced 20 - 40% for improved quality of life  ONGOING  2. Pt to demonstrate functional shoulder pattern 1 (extension, IR,horiz add) w/o pain for improved functional mobility ONGOING  3. Pt to increase strength by 1/2 grade in all glenohumeral and periscapular muscles for improved functional mobility. ONGOING  4. Pt to to exhibit correct exercise patterns of movement for return demonstration of HEP ONGOING  5. Pt to demonstrate shoulder pattern 2 w/o pain for restoring functional movement pattern ONGOING     Long Term Goals: 10 weeks   1. Pt to perform UE pattern 2 movement to achieve functional overhead reach without pain  ONGOING  2. Pt to achieve increase active shoulder mobility by 15 to 20 degrees in flexion, abduction, extension and external rotation to restore functional mobility pattern and shoulder movement behind the back and overhead. ONGOING  3. Pt to achieve increased thoracic extension / rotation to 50 degrees mobility to enhance functional shoulder mobility ONGOING  4. Pt to demonstrate independence with  HEP for self management. ONGOING  5. Pt to achieve increased glenohumeral and periscapular muscle strength by one grade to restore functional left UE mobility. ONGOING     PLAN   Plan of care Certification: 3/10/2022 to 6/10/2022.     Outpatient Physical Therapy 2 times weekly for 10 weeks to include the following interventions: Manual Therapy, Neuromuscular Re-ed, Patient Education and Therapeutic Exercise. Modalities PRN    Brown Hernandez, PTA

## 2022-04-11 ENCOUNTER — CLINICAL SUPPORT (OUTPATIENT)
Dept: REHABILITATION | Facility: HOSPITAL | Age: 72
End: 2022-04-11
Payer: MEDICARE

## 2022-04-11 DIAGNOSIS — R29.898 DECREASED STRENGTH OF UPPER EXTREMITY: ICD-10-CM

## 2022-04-11 DIAGNOSIS — M25.612 IMPAIRED RANGE OF MOTION OF LEFT SHOULDER: Primary | ICD-10-CM

## 2022-04-11 DIAGNOSIS — R68.89 IMPAIRED FUNCTION OF UPPER EXTREMITY: ICD-10-CM

## 2022-04-11 PROCEDURE — 97110 THERAPEUTIC EXERCISES: CPT | Mod: PO

## 2022-04-11 PROCEDURE — 97140 MANUAL THERAPY 1/> REGIONS: CPT | Mod: PO

## 2022-04-11 NOTE — PROGRESS NOTES
"  Physical Therapy Treatment Note/PROGRESS REPORT     Name: García Mercado  Clinic Number: [id number]    Therapy Diagnosis:   Encounter Diagnoses   Name Primary?    Impaired range of motion of left shoulder Yes    Impaired function of upper extremity     Decreased strength of upper extremity      Physician: Brady Ravi PA*    Visit Date: 4/11/2022  Physician Orders: PT Eval and Treat left shoulder  Medical Diagnosis from Referral:   Acute pain of left shoulder [M25.512]     Evaluation Date: 3/10/2022  Authorization Period Expiration: 5/10/2022  Plan of Care Expiration: 6/10/2022  Progress Note Due: 4/10/2022 completed on 4/11/22    Visit # / Visits authorized: 4/7  FOTO: 2/ 3        Time In: 1444  Time Out: 1530  Total Billable Time: 46minutes    Precautions: Standard    Subjective     Pt reports: "same."    She was compliant with home exercise program.  Response to previous treatment: Felt fine.   Functional change: ongoing    Pain: did not rate /10  Location: left shoulder   Pain:  Current 5/10; 2/10  worst 5/10; 3-4/10 at worst   best 3/10 2/10  Location: left shoulder, anterior and posterior aspects    Description: Sharp  Aggravating Factors: reaching behind her back better  Easing Factors: holding the arm close to her side   Sleep: not disturbed same     Current Level of Function:   Personal - dressing to put left arm in the sleeve. better      Objective   Palpation: pt w/significant joint crepitus during PROM.   Increased tissue tension of pecs.    Shoulder Range of Motion:   Shoulder   Left   Pain/Dysfunction with Movement     AROM PROM MMT  AROM   flexion 120p 145p 3+/5   112 deg   extension 35p 50p 3+/5  42deg    abduction 90p 130p 3+/5p  84 deg   Internal rotation 60 70 4-/5  fxl reach to L1   ER at 90° abd 50p 70p 3+/5  fxl reach to T2 w/pain   ER at 0° abd 60p 70p 4-/5  NT         STRENGTH LEFT   Supraspinatus 3+/5      Subscapularis  4-/5       Teres minor  3+/5        Infraspinatus   4-/5 "       García received therapeutic exercises to develop strength, endurance, ROM, flexibility and posture for 38 minutes including:    UBE fwd/bkwd x8mins total L1.5  NP-serratus wall slides 3x10  NP- pulleys for flexion in sitting x 5 mins  supine cane B shoulder flexion 10x10   Sitting table slides for flexion and ABD 10x10  Landmines using dowel, turned slightly into scaption x30  AAROM supine flexion with dowel x20, able to complete w/B arms in ER (thumbs out)  shoulder rolls bkwds x 30   Supine serratus punches x 30       NP- Supine horizontal abduction YTB x 20      Pt received MT for 8 mins to improve L shoulder ROM and decrease pain.  - PROM for flexion, ABD , scaption and ER      Home Exercises Provided and Patient Education Provided     Education provided:   - cueing and demo     Written Home Exercises Provided: Patient instructed to cont prior HEP.  Exercises were reviewed and García was able to demonstrate them prior to the end of the session.  García demonstrated good  understanding of the education provided.     See EMR under Patient Instructions for exercises provided prior visit.    Assessment   Pt with no significant change in her reported pain levels as compared to start of PT POC. She is happy with her treatment and being able to do some exercises in therapy.  She notes a few subjective improvements however. Seemed to benefit from MT today will reassess at next session to decide whether its beneficial to continue w/manual techs in future sessions.  She remains motivated to participate in skilled PT services to restore her LOF.     García Is progressing well towards her goals.   Pt prognosis is Good.      Limitation/Restriction for FOTO shoulder IE Survey     Therapist reviewed FOTO scores for García Mercado on 4/11/2022.   FOTO documents entered into [employer] - see Media section.     Limitation Score: 46%           Pt will continue to benefit from skilled outpatient physical therapy to address the deficits  listed in the problem list box on initial evaluation, provide pt/family education and to maximize pt's level of independence in the home and community environment.     Pt's spiritual, cultural and educational needs considered and pt agreeable to plan of care and goals.     Anticipated barriers to physical therapy: none    Goals:  Shoulder goals to be met:  Short Term Goals: 5 weeks   1. Pts pain intensity reduced 20 - 40% for improved quality of life ongoing  2. Pt to demonstrate functional shoulder pattern 1 (extension, IR,horiz add) w/o pain for improved functional mobility ongoing  3. Pt to increase strength by 1/2 grade in all glenohumeral and periscapular muscles for improved functional mobility. ongoing  4. Pt to to exhibit correct exercise patterns of movement for return demonstration of HEP ongoing  5. Pt to demonstrate shoulder pattern 2 w/o pain for restoring functional movement pattern ongoing     Long Term Goals: 10 weeks   1. Pt to perform UE pattern 2 movement to achieve functional overhead reach without pain  ONGOING  2. Pt to achieve increase active shoulder mobility by 15 to 20 degrees in flexion, abduction, extension and external rotation to restore functional mobility pattern and shoulder movement behind the back and overhead. ONGOING  3. Pt to achieve increased thoracic extension / rotation to 50 degrees mobility to enhance functional shoulder mobility ONGOING  4. Pt to demonstrate independence with HEP for self management. ONGOING  5. Pt to achieve increased glenohumeral and periscapular muscle strength by one grade to restore functional left UE mobility. ONGOING     PLAN   Plan of care Certification: 3/10/2022 to 6/10/2022.     Outpatient Physical Therapy 2 times weekly for 10 weeks to include the following interventions: Manual Therapy, Neuromuscular Re-ed, Patient Education and Therapeutic Exercise. Modalities PRN    Camryn Blackman, PT

## 2022-04-13 ENCOUNTER — CLINICAL SUPPORT (OUTPATIENT)
Dept: REHABILITATION | Facility: HOSPITAL | Age: 72
End: 2022-04-13
Payer: MEDICARE

## 2022-04-13 DIAGNOSIS — R29.898 DECREASED STRENGTH OF UPPER EXTREMITY: ICD-10-CM

## 2022-04-13 DIAGNOSIS — M25.612 IMPAIRED RANGE OF MOTION OF LEFT SHOULDER: Primary | ICD-10-CM

## 2022-04-13 DIAGNOSIS — R68.89 IMPAIRED FUNCTION OF UPPER EXTREMITY: ICD-10-CM

## 2022-04-13 PROCEDURE — 97140 MANUAL THERAPY 1/> REGIONS: CPT | Mod: PO

## 2022-04-13 PROCEDURE — 97110 THERAPEUTIC EXERCISES: CPT | Mod: PO

## 2022-04-13 NOTE — PROGRESS NOTES
"  Physical Therapy Treatment Note     Name: García Mercado  Clinic Number: [id number]    Therapy Diagnosis:   No diagnosis found.  Physician: Brady Ravi PA*    Visit Date: 4/13/2022  Physician Orders: PT Eval and Treat left shoulder  Medical Diagnosis from Referral:   Acute pain of left shoulder [M25.512]     Evaluation Date: 3/10/2022  Authorization Period Expiration: 5/10/2022  Plan of Care Expiration: 6/10/2022  Progress Note Due: 5/11/22  Visit # / Visits authorized: 6/7  FOTO: 2/ 3        Time In: 3:00 pm  Time Out: 3:40 pm  Total Billable Time: 40 minutes    Precautions: Standard    Subjective     Pt reports: the same as last time    She was compliant with home exercise program.  Response to previous treatment: Felt fine.   Functional change: ongoing    Pain: 2 /10  Location: left shoulder       Objective   Palpation: pt w/significant joint crepitus during PROM.   Increased tissue tension of pecs.    Shoulder Range of Motion:   Shoulder   Left   Pain/Dysfunction with Movement     AROM PROM MMT  AROM   flexion 120p 145p 3+/5   112 deg   extension 35p 50p 3+/5  42deg    abduction 90p 130p 3+/5p  84 deg   Internal rotation 60 70 4-/5  fxl reach to L1   ER at 90° abd 50p 70p 3+/5  fxl reach to T2 w/pain   ER at 0° abd 60p 70p 4-/5  NT         STRENGTH LEFT   Supraspinatus 3+/5      Subscapularis  4-/5       Teres minor  3+/5        Infraspinatus   4-/5       Treatment       García received therapeutic exercises to develop strength, endurance, ROM, flexibility and posture for 32 minutes including:    UBE fwd/bkwd x6 mins total L1.5  pulleys for flexion in sitting x 5 mins  +scap squeezes 20x3" with verbal cues for scapular position  supine cane B shoulder flexion 15x10   AAROM supine flexion with dowel x20, able to complete w/B arms in ER (thumbs out)  Supine serratus punches 2# x30 B  Supine horizontal abduction YTB x 20  shoulder rolls bkwds x 30   Sitting table slides for flexion and ABD 5x15  serratus wall " slides 3x10  Landmines using dowel, turned slightly into scaption x30  Shoulder rows OTB 2x10 B  Shoulder extension OTB 2x10 B    Consider:  SL external rotation  Prone rows  Prone Ts  Prone extension      Pt received MT for 8 mins to improve L shoulder ROM and decrease pain.  - PROM for flexion, ABD , scaption and ER  -GH joint mobs inferior and posterior      Education     Home Exercises Provided and Patient Education Provided     Education provided:   - cueing and demo     Written Home Exercises Provided: Patient instructed to cont prior HEP.  Exercises were reviewed and García was able to demonstrate them prior to the end of the session.  García demonstrated good  understanding of the education provided.     See EMR under Patient Instructions for exercises provided prior visit.    Assessment     Pt arrives today stating that her shoulder is still the same but that the manual therapy performed last visit was helpful. She is able to tolerate all exercises well including newly added activities. She was instructed to complete scap squeezes daily at home as a precursor for prone rows, extensions, and T's. Continue to progress with periscapular strength.     García Is progressing well towards her goals.   Pt prognosis is Good.     Pt will continue to benefit from skilled outpatient physical therapy to address the deficits listed in the problem list box on initial evaluation, provide pt/family education and to maximize pt's level of independence in the home and community environment.     Pt's spiritual, cultural and educational needs considered and pt agreeable to plan of care and goals.     Anticipated barriers to physical therapy: none    Goals:  Shoulder goals to be met:  Short Term Goals: 5 weeks   1. Pts pain intensity reduced 20 - 40% for improved quality of life ongoing  2. Pt to demonstrate functional shoulder pattern 1 (extension, IR,horiz add) w/o pain for improved functional mobility ongoing  3. Pt to increase  strength by 1/2 grade in all glenohumeral and periscapular muscles for improved functional mobility. ongoing  4. Pt to to exhibit correct exercise patterns of movement for return demonstration of HEP ongoing  5. Pt to demonstrate shoulder pattern 2 w/o pain for restoring functional movement pattern ongoing     Long Term Goals: 10 weeks   1. Pt to perform UE pattern 2 movement to achieve functional overhead reach without pain  ONGOING  2. Pt to achieve increase active shoulder mobility by 15 to 20 degrees in flexion, abduction, extension and external rotation to restore functional mobility pattern and shoulder movement behind the back and overhead. ONGOING  3. Pt to achieve increased thoracic extension / rotation to 50 degrees mobility to enhance functional shoulder mobility ONGOING  4. Pt to demonstrate independence with HEP for self management. ONGOING  5. Pt to achieve increased glenohumeral and periscapular muscle strength by one grade to restore functional left UE mobility. ONGOING     PLAN   Plan of care Certification: 3/10/2022 to 6/10/2022.     Outpatient Physical Therapy 2 times weekly for 10 weeks to include the following interventions: Manual Therapy, Neuromuscular Re-ed, Patient Education and Therapeutic Exercise. Modalities EM Watson, PT

## 2022-04-18 ENCOUNTER — CLINICAL SUPPORT (OUTPATIENT)
Dept: REHABILITATION | Facility: HOSPITAL | Age: 72
End: 2022-04-18
Payer: MEDICARE

## 2022-04-18 DIAGNOSIS — R68.89 IMPAIRED FUNCTION OF UPPER EXTREMITY: ICD-10-CM

## 2022-04-18 DIAGNOSIS — R29.898 DECREASED STRENGTH OF UPPER EXTREMITY: ICD-10-CM

## 2022-04-18 DIAGNOSIS — M25.612 IMPAIRED RANGE OF MOTION OF LEFT SHOULDER: Primary | ICD-10-CM

## 2022-04-18 PROCEDURE — 97110 THERAPEUTIC EXERCISES: CPT | Mod: PO,CQ

## 2022-04-18 NOTE — PROGRESS NOTES
"  Physical Therapy Treatment Note     Name: García Mercado  Clinic Number: [id number]    Therapy Diagnosis:   Encounter Diagnoses   Name Primary?    Impaired range of motion of left shoulder Yes    Impaired function of upper extremity     Decreased strength of upper extremity      Physician: Brady Ravi PA*    Visit Date: 4/18/2022  Physician Orders: PT Eval and Treat left shoulder  Medical Diagnosis from Referral:   Acute pain of left shoulder [M25.512]     Evaluation Date: 3/10/2022  Authorization Period Expiration: 5/10/2022  Plan of Care Expiration: 6/10/2022  Progress Note Due: 5/11/22  Visit # / Visits authorized: 7/7  FOTO: 2/ 3        Time In: 1:45 pm  Time Out: 2:25 pm  Total Billable Time: 40 minutes    Precautions: Standard    Subjective     Pt reports: no new complaints or concerns at this time.   She was compliant with home exercise program.  Response to previous treatment: Felt fine.   Functional change: ongoing    Pain:  Did not rate /10  Location: left shoulder       Objective         Treatment       García received therapeutic exercises to develop strength, endurance, ROM, flexibility and posture for 32 minutes including:    UBE fwd/bkwd x8 mins total L1.5  pulleys for flexion in sitting x 5 mins  scap squeezes 20x3" with verbal cues for scapular position  supine cane B shoulder flexion 15x10   AAROM supine flexion with dowel x20, able to complete w/B arms in ER (thumbs out)  Supine serratus punches 2# x30 B   Supine horizontal abduction YTB x 30   shoulder rolls bkwds x 30   Sitting table slides for flexion and ABD 5x15  serratus wall slides 3x10   Landmines using dowel, turned slightly into scaption x30  Shoulder rows OTB 3x10 B  Shoulder extension OTB 3x10 B    Consider:  SL external rotation  Prone rows  Prone Ts  Prone extension      Pt received MT for 0 mins to improve L shoulder ROM and decrease pain.  - PROM for flexion, ABD , scaption and ER  -GH joint mobs inferior and " posterior      Education     Home Exercises Provided and Patient Education Provided     Education provided:   - cueing and demo     Written Home Exercises Provided: Patient instructed to cont prior HEP.  Exercises were reviewed and García was able to demonstrate them prior to the end of the session.  García demonstrated good  understanding of the education provided.     See EMR under Patient Instructions for exercises provided prior visit.    Assessment     Pt was able to tolerate increased repetitions this afternoon on majority of her exercises without reports of increased pain.  Pt required some tactile cueing on proper exercise form during thera band exercises to prevent upper trap hiking.  Will continue to monitor and progress pt within her tolerance.     García Is progressing well towards her goals.   Pt prognosis is Good.     Pt will continue to benefit from skilled outpatient physical therapy to address the deficits listed in the problem list box on initial evaluation, provide pt/family education and to maximize pt's level of independence in the home and community environment.     Pt's spiritual, cultural and educational needs considered and pt agreeable to plan of care and goals.     Anticipated barriers to physical therapy: none    Goals:  Shoulder goals to be met:  Short Term Goals: 5 weeks   1. Pts pain intensity reduced 20 - 40% for improved quality of life ongoing  2. Pt to demonstrate functional shoulder pattern 1 (extension, IR,horiz add) w/o pain for improved functional mobility ongoing  3. Pt to increase strength by 1/2 grade in all glenohumeral and periscapular muscles for improved functional mobility. ongoing  4. Pt to to exhibit correct exercise patterns of movement for return demonstration of HEP ongoing  5. Pt to demonstrate shoulder pattern 2 w/o pain for restoring functional movement pattern ongoing     Long Term Goals: 10 weeks   1. Pt to perform UE pattern 2 movement to achieve functional  overhead reach without pain  ONGOING  2. Pt to achieve increase active shoulder mobility by 15 to 20 degrees in flexion, abduction, extension and external rotation to restore functional mobility pattern and shoulder movement behind the back and overhead. ONGOING  3. Pt to achieve increased thoracic extension / rotation to 50 degrees mobility to enhance functional shoulder mobility ONGOING  4. Pt to demonstrate independence with HEP for self management. ONGOING  5. Pt to achieve increased glenohumeral and periscapular muscle strength by one grade to restore functional left UE mobility. ONGOING     PLAN   Plan of care Certification: 3/10/2022 to 6/10/2022.     Outpatient Physical Therapy 2 times weekly for 10 weeks to include the following interventions: Manual Therapy, Neuromuscular Re-ed, Patient Education and Therapeutic Exercise. Modalities EM Hernandez, PTA

## 2022-04-25 ENCOUNTER — CLINICAL SUPPORT (OUTPATIENT)
Dept: REHABILITATION | Facility: HOSPITAL | Age: 72
End: 2022-04-25
Payer: MEDICARE

## 2022-04-25 DIAGNOSIS — R29.898 DECREASED STRENGTH OF UPPER EXTREMITY: ICD-10-CM

## 2022-04-25 DIAGNOSIS — M25.612 IMPAIRED RANGE OF MOTION OF LEFT SHOULDER: Primary | ICD-10-CM

## 2022-04-25 DIAGNOSIS — R68.89 IMPAIRED FUNCTION OF UPPER EXTREMITY: ICD-10-CM

## 2022-04-25 PROCEDURE — 97110 THERAPEUTIC EXERCISES: CPT | Mod: PO,CQ

## 2022-04-25 NOTE — PROGRESS NOTES
"  Physical Therapy Treatment Note     Name: García Mercado  Clinic Number: [id number]    Therapy Diagnosis:   Encounter Diagnoses   Name Primary?    Impaired range of motion of left shoulder Yes    Impaired function of upper extremity     Decreased strength of upper extremity      Physician: Brady Ravi PA*    Visit Date: 4/25/2022  Physician Orders: PT Eval and Treat left shoulder  Medical Diagnosis from Referral:   Acute pain of left shoulder [M25.512]     Evaluation Date: 3/10/2022  Authorization Period Expiration: 5/10/2022  Plan of Care Expiration: 6/10/2022  Progress Note Due: 5/11/22  Visit # / Visits authorized: 8/13  FOTO: 2/ 3        Time In: 2:30 pm  Time Out: 3:10 pm  Total Billable Time: 40 minutes    Precautions: Standard    Subjective     Pt reports: no new issues or concerns at this time.   She was compliant with home exercise program.  Response to previous treatment: Felt fine.   Functional change: ongoing    Pain:  Did not rate /10  Location: left shoulder       Objective         Treatment       García received therapeutic exercises to develop strength, endurance, ROM, flexibility and posture for 40 minutes including:    UBE fwd/bkwd x8 mins total L1.5  pulleys for flexion in sitting x 5 mins  scap squeezes 20x3" with verbal cues for scapular position  supine cane B shoulder flexion 15x10   AAROM supine flexion with dowel x20, able to complete w/B arms in ER (thumbs out)  Supine serratus punches 3# dowel x30 B   Supine horizontal abduction YTB x 30   shoulder rolls bkwds x 30   Sitting table slides for flexion and ABD 5x15  serratus wall slides 3x10   Landmines using dowel, turned slightly into scaption x30  Shoulder rows OTB 3x10 B  Shoulder extension OTB 3x10 B  Prone extension 3 x 10  Prone rows 3 x 10   Prone Ts 2 x 10    Consider:  SL external rotation    Pt received MT for 0 mins to improve L shoulder ROM and decrease pain.  - PROM for flexion, ABD , scaption and ER  -GH joint mobs " inferior and posterior      Education     Home Exercises Provided and Patient Education Provided     Education provided:   - cueing and demo     Written Home Exercises Provided: Patient instructed to cont prior HEP.  Exercises were reviewed and García was able to demonstrate them prior to the end of the session.  García demonstrated good  understanding of the education provided.     See EMR under Patient Instructions for exercises provided prior visit.    Assessment      Prone exercises were added this afternoon to continue to work on improving pt's L UE strength and L shoulder stability.  Pt with no pain post treatment session.  Will continue to monitor and progress pt within her tolerance.     García Is progressing well towards her goals.   Pt prognosis is Good.     Pt will continue to benefit from skilled outpatient physical therapy to address the deficits listed in the problem list box on initial evaluation, provide pt/family education and to maximize pt's level of independence in the home and community environment.     Pt's spiritual, cultural and educational needs considered and pt agreeable to plan of care and goals.     Anticipated barriers to physical therapy: none    Goals:  Shoulder goals to be met:  Short Term Goals: 5 weeks   1. Pts pain intensity reduced 20 - 40% for improved quality of life ongoing  2. Pt to demonstrate functional shoulder pattern 1 (extension, IR,horiz add) w/o pain for improved functional mobility ongoing  3. Pt to increase strength by 1/2 grade in all glenohumeral and periscapular muscles for improved functional mobility. ongoing  4. Pt to to exhibit correct exercise patterns of movement for return demonstration of HEP ongoing  5. Pt to demonstrate shoulder pattern 2 w/o pain for restoring functional movement pattern ongoing     Long Term Goals: 10 weeks   1. Pt to perform UE pattern 2 movement to achieve functional overhead reach without pain  ONGOING  2. Pt to achieve increase active  shoulder mobility by 15 to 20 degrees in flexion, abduction, extension and external rotation to restore functional mobility pattern and shoulder movement behind the back and overhead. ONGOING  3. Pt to achieve increased thoracic extension / rotation to 50 degrees mobility to enhance functional shoulder mobility ONGOING  4. Pt to demonstrate independence with HEP for self management. ONGOING  5. Pt to achieve increased glenohumeral and periscapular muscle strength by one grade to restore functional left UE mobility. ONGOING     PLAN   Plan of care Certification: 3/10/2022 to 6/10/2022.     Outpatient Physical Therapy 2 times weekly for 10 weeks to include the following interventions: Manual Therapy, Neuromuscular Re-ed, Patient Education and Therapeutic Exercise. Modalities PRN    Brown Hernandez, PTA

## 2022-04-27 ENCOUNTER — CLINICAL SUPPORT (OUTPATIENT)
Dept: REHABILITATION | Facility: HOSPITAL | Age: 72
End: 2022-04-27
Payer: MEDICARE

## 2022-04-27 DIAGNOSIS — M25.612 IMPAIRED RANGE OF MOTION OF LEFT SHOULDER: Primary | ICD-10-CM

## 2022-04-27 DIAGNOSIS — R68.89 IMPAIRED FUNCTION OF UPPER EXTREMITY: ICD-10-CM

## 2022-04-27 DIAGNOSIS — R29.898 DECREASED STRENGTH OF UPPER EXTREMITY: ICD-10-CM

## 2022-04-27 PROCEDURE — 97110 THERAPEUTIC EXERCISES: CPT | Mod: PO,CQ

## 2022-04-27 NOTE — PROGRESS NOTES
"  Physical Therapy Treatment Note     Name: García Mercado  Clinic Number: [id number]    Therapy Diagnosis:   Encounter Diagnoses   Name Primary?    Impaired range of motion of left shoulder Yes    Impaired function of upper extremity     Decreased strength of upper extremity      Physician: Brady Ravi PA*    Visit Date: 4/27/2022  Physician Orders: PT Eval and Treat left shoulder  Medical Diagnosis from Referral:   Acute pain of left shoulder [M25.512]     Evaluation Date: 3/10/2022  Authorization Period Expiration: 5/10/2022  Plan of Care Expiration: 6/10/2022  Progress Note Due: 5/11/22  Visit # / Visits authorized: 9/13  FOTO: 2/ 3        Time In: 3:00 pm  Time Out: 3:40 pm  Total Billable Time: 40 minutes    Precautions: Standard    Subjective     Pt reports: her L shoulder feels a little bit better.    She was compliant with home exercise program.  Response to previous treatment: Felt fine.   Functional change: ongoing    Pain:  Did not rate /10  Location: left shoulder       Objective         Treatment       García received therapeutic exercises to develop strength, endurance, ROM, flexibility and posture for 40 minutes including:    UBE fwd/bkwd x8 mins total L1.5  pulleys for flexion in sitting x 5 mins  scap squeezes 20x3" with verbal cues for scapular position  supine cane B shoulder flexion 15x10   AAROM supine flexion with dowel x20, able to complete w/B arms in ER (thumbs out)  Supine serratus punches 3# dowel x30 B   Supine horizontal abduction RTB x 30   shoulder rolls bkwds x 30   Sitting table slides for flexion and ABD 5x15  serratus wall slides 3x10   Landmines using dowel, turned slightly into scaption x30  Shoulder rows OTB 3x10 B  Shoulder extension OTB 3x10 B  Prone extension 3 x 10  Prone rows 3 x 10   Prone Ts 3 x 10    Consider:  SL external rotation    Pt received MT for 0 mins to improve L shoulder ROM and decrease pain.  - PROM for flexion, ABD , scaption and ER  -GH joint mobs " inferior and posterior      Education     Home Exercises Provided and Patient Education Provided     Education provided:   - cueing and demo     Written Home Exercises Provided: Patient instructed to cont prior HEP.  Exercises were reviewed and García was able to demonstrate them prior to the end of the session.  García demonstrated good  understanding of the education provided.     See EMR under Patient Instructions for exercises provided prior visit.    Assessment      Pt with no pain post treatment session.  Pt performed the above exercises with good tolerance requiring subtle cueing on proper exercise form.  Will continue to monitor and progress pt within her tolerance.     García Is progressing well towards her goals.   Pt prognosis is Good.     Pt will continue to benefit from skilled outpatient physical therapy to address the deficits listed in the problem list box on initial evaluation, provide pt/family education and to maximize pt's level of independence in the home and community environment.     Pt's spiritual, cultural and educational needs considered and pt agreeable to plan of care and goals.     Anticipated barriers to physical therapy: none    Goals:  Shoulder goals to be met:  Short Term Goals: 5 weeks   1. Pts pain intensity reduced 20 - 40% for improved quality of life ongoing  2. Pt to demonstrate functional shoulder pattern 1 (extension, IR,horiz add) w/o pain for improved functional mobility ongoing  3. Pt to increase strength by 1/2 grade in all glenohumeral and periscapular muscles for improved functional mobility. ongoing  4. Pt to to exhibit correct exercise patterns of movement for return demonstration of HEP ongoing  5. Pt to demonstrate shoulder pattern 2 w/o pain for restoring functional movement pattern ongoing     Long Term Goals: 10 weeks   1. Pt to perform UE pattern 2 movement to achieve functional overhead reach without pain  ONGOING  2. Pt to achieve increase active shoulder mobility  by 15 to 20 degrees in flexion, abduction, extension and external rotation to restore functional mobility pattern and shoulder movement behind the back and overhead. ONGOING  3. Pt to achieve increased thoracic extension / rotation to 50 degrees mobility to enhance functional shoulder mobility ONGOING  4. Pt to demonstrate independence with HEP for self management. ONGOING  5. Pt to achieve increased glenohumeral and periscapular muscle strength by one grade to restore functional left UE mobility. ONGOING     PLAN   Plan of care Certification: 3/10/2022 to 6/10/2022.     Outpatient Physical Therapy 2 times weekly for 10 weeks to include the following interventions: Manual Therapy, Neuromuscular Re-ed, Patient Education and Therapeutic Exercise. Modalities PRN    Brown Hernandez, PTA

## 2022-05-02 ENCOUNTER — CLINICAL SUPPORT (OUTPATIENT)
Dept: REHABILITATION | Facility: HOSPITAL | Age: 72
End: 2022-05-02
Payer: MEDICARE

## 2022-05-02 DIAGNOSIS — R29.898 DECREASED STRENGTH OF UPPER EXTREMITY: ICD-10-CM

## 2022-05-02 DIAGNOSIS — R68.89 IMPAIRED FUNCTION OF UPPER EXTREMITY: ICD-10-CM

## 2022-05-02 DIAGNOSIS — M25.612 IMPAIRED RANGE OF MOTION OF LEFT SHOULDER: Primary | ICD-10-CM

## 2022-05-02 PROCEDURE — 97110 THERAPEUTIC EXERCISES: CPT | Mod: PO | Performed by: PHYSICAL THERAPIST

## 2022-05-02 NOTE — PROGRESS NOTES
"  Physical Therapy Treatment Note     Name: García Mercado  Clinic Number: [id number]    Therapy Diagnosis:   Encounter Diagnoses   Name Primary?    Impaired range of motion of left shoulder Yes    Impaired function of upper extremity     Decreased strength of upper extremity      Physician: Brady Ravi PA*    Visit Date: 5/2/2022  Physician Orders: PT Eval and Treat left shoulder  Medical Diagnosis from Referral:   Acute pain of left shoulder [M25.512]     Evaluation Date: 3/10/2022  Authorization Period Expiration: 5/10/2022  Plan of Care Expiration: 6/10/2022  Progress Note Due: 5/11/22  Visit # / Visits authorized: 9/13  FOTO: 2/ 3        Time In: 1435 pm  Time Out: 1520       pm   Total Billable Time: 45 minutes    Precautions: Standard    Subjective     Pt reports:having a little pain in the shoulder. It is better but intermittent. Occurs with movement. .    She was compliant with home exercise program.  Response to previous treatment: felt better. No pain.   Functional change: most difficult thing is reaching behind the back.     Pain:  0 /10 at rest, 2/10 with movement   Location: left shoulder       Objective         Treatment       García received therapeutic exercises to develop strength, endurance, ROM, flexibility and posture for 35 minutes including:    UBE fwd/bkwd x8 mins total L1.5  pulleys for flexion in sitting x 5 mins  scap squeezes 20x3" with verbal cues for scapular position  supine cane B shoulder flexion 15x10   AAROM supine flexion with dowel x20, able to complete w/B arms in ER (thumbs out)  Supine serratus punches 3# dowel x30 B   Supine horizontal abduction RTB x 30   shoulder rolls bkwds x 30   Sitting table slides for flexion and ABD 5x15  serratus wall slides 3x10   Landmines using dowel, turned slightly into scaption x30  Shoulder rows OTB 3x10 B  Shoulder extension OTB 3x10 B    Prone extension 3 x 10  Prone rows 3 x 10   Prone Ts 3 x 10    Consider:  SL external rotation    Pt " received MT for 6 mins to improve L shoulder ROM and decrease pain.  - PROM for flexion, ABD and ER  -GH joint mobs inferior, anterior and posterior grades II / III      Education     Home Exercises Provided and Patient Education Provided     Education provided:   - cueing and demo     Written Home Exercises Provided: Patient instructed to cont prior HEP.  Exercises were reviewed and García was able to demonstrate them prior to the end of the session.  García demonstrated good  understanding of the education provided.     See EMR under Patient Instructions for exercises provided prior visit.    Assessment     The patient completed the routine as noted. She responded to manual intervention joint mobs well. There was some pain with hor add (crossover impingement sign) however after manual distraction with hor add the pain was reduced with movement. She completes the exercises with very little cueing. Progress with AROM for strengthening.      García Is progressing well towards her goals.   Pt prognosis is Good.     Pt will continue to benefit from skilled outpatient physical therapy to address the deficits listed in the problem list box on initial evaluation, provide pt/family education and to maximize pt's level of independence in the home and community environment.     Pt's spiritual, cultural and educational needs considered and pt agreeable to plan of care and goals.     Anticipated barriers to physical therapy: none    Goals:  Shoulder goals to be met:  Short Term Goals: 5 weeks   1. Pts pain intensity reduced 20 - 40% for improved quality of life ongoing  2. Pt to demonstrate functional shoulder pattern 1 (extension, IR,horiz add) w/o pain for improved functional mobility ongoing  3. Pt to increase strength by 1/2 grade in all glenohumeral and periscapular muscles for improved functional mobility. ongoing  4. Pt to to exhibit correct exercise patterns of movement for return demonstration of HEP ongoing  5. Pt to  demonstrate shoulder pattern 2 w/o pain for restoring functional movement pattern ongoing     Long Term Goals: 10 weeks   1. Pt to perform UE pattern 2 movement to achieve functional overhead reach without pain  ONGOING  2. Pt to achieve increase active shoulder mobility by 15 to 20 degrees in flexion, abduction, extension and external rotation to restore functional mobility pattern and shoulder movement behind the back and overhead. ONGOING  3. Pt to achieve increased thoracic extension / rotation to 50 degrees mobility to enhance functional shoulder mobility ONGOING  4. Pt to demonstrate independence with HEP for self management. ONGOING  5. Pt to achieve increased glenohumeral and periscapular muscle strength by one grade to restore functional left UE mobility. ONGOING     PLAN   Plan of care Certification: 3/10/2022 to 6/10/2022.     Outpatient Physical Therapy 2 times weekly for 10 weeks to include the following interventions: Manual Therapy, Neuromuscular Re-ed, Patient Education and Therapeutic Exercise. Modalities PRN    Mac Castelan, PT

## 2022-05-04 ENCOUNTER — CLINICAL SUPPORT (OUTPATIENT)
Dept: REHABILITATION | Facility: HOSPITAL | Age: 72
End: 2022-05-04
Payer: MEDICARE

## 2022-05-04 DIAGNOSIS — M25.612 IMPAIRED RANGE OF MOTION OF LEFT SHOULDER: Primary | ICD-10-CM

## 2022-05-04 DIAGNOSIS — R68.89 IMPAIRED FUNCTION OF UPPER EXTREMITY: ICD-10-CM

## 2022-05-04 DIAGNOSIS — R29.898 DECREASED STRENGTH OF UPPER EXTREMITY: ICD-10-CM

## 2022-05-04 PROCEDURE — 97110 THERAPEUTIC EXERCISES: CPT | Mod: PO | Performed by: PHYSICAL THERAPIST

## 2022-05-04 PROCEDURE — 97140 MANUAL THERAPY 1/> REGIONS: CPT | Mod: PO | Performed by: PHYSICAL THERAPIST

## 2022-05-04 NOTE — PROGRESS NOTES
"  Physical Therapy Treatment Note     Name: García Mercado  Clinic Number: [id number]    Therapy Diagnosis:   Encounter Diagnoses   Name Primary?    Impaired range of motion of left shoulder Yes    Impaired function of upper extremity     Decreased strength of upper extremity      Physician: Brady Ravi PA*    Visit Date: 5/4/2022  Physician Orders: PT Eval and Treat left shoulder  Medical Diagnosis from Referral:   Acute pain of left shoulder [M25.512]     Evaluation Date: 3/10/2022  Authorization Period Expiration: 5/10/2022  Plan of Care Expiration: 6/10/2022  Progress Note Due: 5/11/22  Visit # / Visits authorized: 9/13  FOTO: 2/ 3        Time In: 1507 pm  Time Out: 1600   pm   Total Billable Time: 53 minutes    Precautions: Standard    Subjective     Pt reports: the shoulder is feeling better. No pain.       She was compliant with home exercise program.  Response to previous treatment: better.   Functional change: can reach higher.      Pain:  0 /10 at rest, 2/10 with movement   Location: left shoulder       Objective         Treatment       García received therapeutic exercises to develop strength, endurance, ROM, flexibility and posture for 35 minutes including:      UBE fwd/bkwd x8 mins total L1.5    pulleys for flexion in sitting x 5 mins    SUPINE   supine cane B shoulder flexion x20  AAROM supine flexion with dowel x20, able to complete w/B arms in ER (thumbs out)  Supine serratus punches 3# dowel x30 B   Supine horizontal abduction RTB x 30     Seated scap squeezes 20x3" with verbal cues for scapular position  Seated horiz abd T w/emphasis on scapula depression x20   shoulder rolls bkwds x 30   Sitting table slides for flexion and ABD 5x15    STANDING   serratus wall slides 3x10   Landmines using dowel, turned slightly into scaption x30  Shoulder rows OTB 3x10 B  Shoulder extension OTB 3x10 B    Side lying ER x20     Prone extension 3 x 10  Prone rows 3 x 10   Prone Ts 3 x 10  Prone scapula prot / " retr x30     Consider:  SL external rotation    Pt received MT for 15 mins to improve L shoulder ROM and decrease pain.  -PROM for flexion, ABD and ER  -Prone for PROM moving from pattern 1 to pattern 2 and return   -GH joint mobs inferior, anterior and posterior grades II / III / IIII  -shoulder distraction in flexion at 160, abduction at 90     Education     Home Exercises Provided and Patient Education Provided     Education provided:   - cueing and demo     Written Home Exercises Provided: Patient instructed to cont prior HEP.  Exercises were reviewed and García was able to demonstrate them prior to the end of the session.  García demonstrated good  understanding of the education provided.     See EMR under Patient Instructions for exercises provided prior visit.    Assessment     The patient performed the activities noted. She does experience some pain at the end ranges of flexion and internal rotation.  This is most evident with passive patterning in prone. Overall response has been favorable. Initially there was pain in all planes of movement. This has been greatly reduced. Continue to progress with mobility emphasis.     García Is progressing well towards her goals.   Pt prognosis is Good.     Pt will continue to benefit from skilled outpatient physical therapy to address the deficits listed in the problem list box on initial evaluation, provide pt/family education and to maximize pt's level of independence in the home and community environment.     Pt's spiritual, cultural and educational needs considered and pt agreeable to plan of care and goals.     Anticipated barriers to physical therapy: none    Goals:  Shoulder goals to be met:  Short Term Goals: 5 weeks   1. Pts pain intensity reduced 20 - 40% for improved quality of life ongoing  2. Pt to demonstrate functional shoulder pattern 1 (extension, IR,horiz add) w/o pain for improved functional mobility ongoing  3. Pt to increase strength by 1/2 grade in all  glenohumeral and periscapular muscles for improved functional mobility. ongoing  4. Pt to to exhibit correct exercise patterns of movement for return demonstration of HEP ongoing  5. Pt to demonstrate shoulder pattern 2 w/o pain for restoring functional movement pattern ongoing     Long Term Goals: 10 weeks   1. Pt to perform UE pattern 2 movement to achieve functional overhead reach without pain  ONGOING  2. Pt to achieve increase active shoulder mobility by 15 to 20 degrees in flexion, abduction, extension and external rotation to restore functional mobility pattern and shoulder movement behind the back and overhead. ONGOING  3. Pt to achieve increased thoracic extension / rotation to 50 degrees mobility to enhance functional shoulder mobility ONGOING  4. Pt to demonstrate independence with HEP for self management. ONGOING  5. Pt to achieve increased glenohumeral and periscapular muscle strength by one grade to restore functional left UE mobility. ONGOING     PLAN   Plan of care Certification: 3/10/2022 to 6/10/2022.     Outpatient Physical Therapy 2 times weekly for 10 weeks to include the following interventions: Manual Therapy, Neuromuscular Re-ed, Patient Education and Therapeutic Exercise. Modalities PRN    Mac Castelan, PT

## 2022-05-11 ENCOUNTER — CLINICAL SUPPORT (OUTPATIENT)
Dept: REHABILITATION | Facility: HOSPITAL | Age: 72
End: 2022-05-11
Payer: MEDICARE

## 2022-05-11 DIAGNOSIS — R29.898 DECREASED STRENGTH OF UPPER EXTREMITY: ICD-10-CM

## 2022-05-11 DIAGNOSIS — M25.612 IMPAIRED RANGE OF MOTION OF LEFT SHOULDER: Primary | ICD-10-CM

## 2022-05-11 DIAGNOSIS — R68.89 IMPAIRED FUNCTION OF UPPER EXTREMITY: ICD-10-CM

## 2022-05-11 PROCEDURE — 97110 THERAPEUTIC EXERCISES: CPT | Mod: PO | Performed by: PHYSICAL THERAPIST

## 2022-05-11 PROCEDURE — 97140 MANUAL THERAPY 1/> REGIONS: CPT | Mod: PO | Performed by: PHYSICAL THERAPIST

## 2022-05-11 NOTE — PROGRESS NOTES
"  Physical Therapy Treatment Note     Name: García Mercado  Clinic Number: [id number]    Therapy Diagnosis:   Encounter Diagnoses   Name Primary?    Impaired range of motion of left shoulder Yes    Impaired function of upper extremity     Decreased strength of upper extremity      Physician: Brady Ravi PA*    Visit Date: 5/11/2022  Physician Orders: PT Eval and Treat left shoulder  Medical Diagnosis from Referral:   Acute pain of left shoulder [M25.512]     Evaluation Date: 3/10/2022  Authorization Period Expiration: 5/10/2022  Plan of Care Expiration: 6/10/2022  Progress Note Due: 5/11/22  Visit # / Visits authorized: 10 /13  FOTO: 2/ 3        Time In: 1505 pm  Time Out: 1400   pm   Total Billable Time: 55 minutes    Precautions: Standard    Subjective     Pt reports: the shoulder is feeling better. There is no pain if I don't move the arm but if I reach back is is a slight pain      She was compliant with home exercise program.  Response to previous treatment: felt better.   Functional change: can reach higher.      Pain:  0 /10 at rest, 2/10 with movement   Location: left shoulder       Objective         Treatment       García received therapeutic exercises to develop strength, endurance, ROM, flexibility and posture for 45 minutes including:      UBE fwd/bkwd x8 mins total L1.5    pulleys for flexion in sitting x 5 mins    SUPINE   Active arm raise x20   supine cane B shoulder flexion x20  AAROM supine flexion with dowel x20, able to complete w/B arms in ER (thumbs out)  Supine serratus punches 4# DB x50 LUE   Supine horizontal abduction RTB x 30     Seated ER at 0 position x50  Seated towel stretch 7" x 15  Seated scap squeezes 20x3" with verbal cues for scapular position  Seated horiz abd T w/emphasis on scapula depression x20   shoulder rolls bkwds x 30   Sitting table slides for flexion and ABD 5x15    STANDING   serratus wall slides 3x10   Landmines using dowel, turned slightly into scaption " x30  Shoulder rows OTB 3x10 B  Shoulder extension OTB 3x10 B    Side lying ER x20   Side lying abduction x20     Prone extension 2 x 10  Prone rows 2 x 10   Prone Ts 2 x 10  Prone scapula prot / retr x20     Consider:  SL external rotation    Pt received MT for 8 mins to improve L shoulder ROM and decrease pain.  -PROM for flexion, ABD and ER  -Prone for PROM moving from pattern 1 to pattern 2 and return   -GH joint mobs inferior, anterior and posterior grades II / III / IIII  -Passive stretching of the subscapularis, pectorals, teres major to enhance ER .   -manual lateral rotation of the scapula to facilitate abduction in sidelying   -shoulder distraction in flexion at 160, abduction at 90     Education     Home Exercises Provided and Patient Education Provided     Education provided:   - cueing and demo     Written Home Exercises Provided: Patient instructed to cont prior HEP.  Exercises were reviewed and García was able to demonstrate them prior to the end of the session.  García demonstrated good  understanding of the education provided.     See EMR under Patient Instructions for exercises provided prior visit.    Assessment     The patient performed the activities noted. She also responded favorably to manual interventions. Achieved good IR with passive towel stretches and abduction with assisted scapula rotation. Progress with active exercises.     García Is progressing well towards her goals.   Pt prognosis is Good.     Pt will continue to benefit from skilled outpatient physical therapy to address the deficits listed in the problem list box on initial evaluation, provide pt/family education and to maximize pt's level of independence in the home and community environment.     Pt's spiritual, cultural and educational needs considered and pt agreeable to plan of care and goals.     Anticipated barriers to physical therapy: none    Goals:  Shoulder goals to be met:  Short Term Goals: 5 weeks   1. Pts pain intensity  reduced 20 - 40% for improved quality of life ongoing  2. Pt to demonstrate functional shoulder pattern 1 (extension, IR,horiz add) w/o pain for improved functional mobility ongoing  3. Pt to increase strength by 1/2 grade in all glenohumeral and periscapular muscles for improved functional mobility. ongoing  4. Pt to to exhibit correct exercise patterns of movement for return demonstration of HEP ongoing  5. Pt to demonstrate shoulder pattern 2 w/o pain for restoring functional movement pattern ongoing     Long Term Goals: 10 weeks   1. Pt to perform UE pattern 2 movement to achieve functional overhead reach without pain  ONGOING  2. Pt to achieve increase active shoulder mobility by 15 to 20 degrees in flexion, abduction, extension and external rotation to restore functional mobility pattern and shoulder movement behind the back and overhead. ONGOING  3. Pt to achieve increased thoracic extension / rotation to 50 degrees mobility to enhance functional shoulder mobility ONGOING  4. Pt to demonstrate independence with HEP for self management. ONGOING  5. Pt to achieve increased glenohumeral and periscapular muscle strength by one grade to restore functional left UE mobility. ONGOING     PLAN   Plan of care Certification: 3/10/2022 to 6/10/2022.     Outpatient Physical Therapy 2 times weekly for 10 weeks to include the following interventions: Manual Therapy, Neuromuscular Re-ed, Patient Education and Therapeutic Exercise. Modalities PRN    Mac Castelan, PT

## 2022-08-01 ENCOUNTER — OFFICE VISIT (OUTPATIENT)
Dept: INTERNAL MEDICINE | Facility: CLINIC | Age: 72
End: 2022-08-01
Payer: MEDICARE

## 2022-08-01 VITALS
SYSTOLIC BLOOD PRESSURE: 104 MMHG | HEART RATE: 70 BPM | WEIGHT: 115.31 LBS | HEIGHT: 62 IN | TEMPERATURE: 98 F | RESPIRATION RATE: 10 BRPM | BODY MASS INDEX: 21.22 KG/M2 | DIASTOLIC BLOOD PRESSURE: 64 MMHG

## 2022-08-01 DIAGNOSIS — Z11.59 ENCOUNTER FOR HEPATITIS C SCREENING TEST FOR LOW RISK PATIENT: ICD-10-CM

## 2022-08-01 DIAGNOSIS — R73.01 ABNORMAL FASTING GLUCOSE: ICD-10-CM

## 2022-08-01 DIAGNOSIS — E55.9 VITAMIN D DEFICIENCY: ICD-10-CM

## 2022-08-01 DIAGNOSIS — Z00.00 PHYSICAL EXAM, ANNUAL: Primary | ICD-10-CM

## 2022-08-01 DIAGNOSIS — I10 HYPERTENSION, UNSPECIFIED TYPE: ICD-10-CM

## 2022-08-01 DIAGNOSIS — R93.3 ABNORMAL COLONOSCOPY: ICD-10-CM

## 2022-08-01 DIAGNOSIS — Z11.4 SCREENING FOR HIV (HUMAN IMMUNODEFICIENCY VIRUS): ICD-10-CM

## 2022-08-01 PROCEDURE — 99999 PR PBB SHADOW E&M-EST. PATIENT-LVL III: ICD-10-PCS | Mod: PBBFAC,,, | Performed by: STUDENT IN AN ORGANIZED HEALTH CARE EDUCATION/TRAINING PROGRAM

## 2022-08-01 PROCEDURE — 1125F AMNT PAIN NOTED PAIN PRSNT: CPT | Mod: CPTII,S$GLB,, | Performed by: STUDENT IN AN ORGANIZED HEALTH CARE EDUCATION/TRAINING PROGRAM

## 2022-08-01 PROCEDURE — 1101F PR PT FALLS ASSESS DOC 0-1 FALLS W/OUT INJ PAST YR: ICD-10-PCS | Mod: CPTII,S$GLB,, | Performed by: STUDENT IN AN ORGANIZED HEALTH CARE EDUCATION/TRAINING PROGRAM

## 2022-08-01 PROCEDURE — 99999 PR PBB SHADOW E&M-EST. PATIENT-LVL III: CPT | Mod: PBBFAC,,, | Performed by: STUDENT IN AN ORGANIZED HEALTH CARE EDUCATION/TRAINING PROGRAM

## 2022-08-01 PROCEDURE — 3078F DIAST BP <80 MM HG: CPT | Mod: CPTII,S$GLB,, | Performed by: STUDENT IN AN ORGANIZED HEALTH CARE EDUCATION/TRAINING PROGRAM

## 2022-08-01 PROCEDURE — 3074F PR MOST RECENT SYSTOLIC BLOOD PRESSURE < 130 MM HG: ICD-10-PCS | Mod: CPTII,S$GLB,, | Performed by: STUDENT IN AN ORGANIZED HEALTH CARE EDUCATION/TRAINING PROGRAM

## 2022-08-01 PROCEDURE — 90677 PNEUMOCOCCAL CONJUGATE VACCINE 20-VALENT: ICD-10-PCS | Mod: S$GLB,,, | Performed by: STUDENT IN AN ORGANIZED HEALTH CARE EDUCATION/TRAINING PROGRAM

## 2022-08-01 PROCEDURE — 3288F PR FALLS RISK ASSESSMENT DOCUMENTED: ICD-10-PCS | Mod: CPTII,S$GLB,, | Performed by: STUDENT IN AN ORGANIZED HEALTH CARE EDUCATION/TRAINING PROGRAM

## 2022-08-01 PROCEDURE — G0009 ADMIN PNEUMOCOCCAL VACCINE: HCPCS | Mod: S$GLB,,, | Performed by: STUDENT IN AN ORGANIZED HEALTH CARE EDUCATION/TRAINING PROGRAM

## 2022-08-01 PROCEDURE — 1159F PR MEDICATION LIST DOCUMENTED IN MEDICAL RECORD: ICD-10-PCS | Mod: CPTII,S$GLB,, | Performed by: STUDENT IN AN ORGANIZED HEALTH CARE EDUCATION/TRAINING PROGRAM

## 2022-08-01 PROCEDURE — G0009 PNEUMOCOCCAL CONJUGATE VACCINE 20-VALENT: ICD-10-PCS | Mod: S$GLB,,, | Performed by: STUDENT IN AN ORGANIZED HEALTH CARE EDUCATION/TRAINING PROGRAM

## 2022-08-01 PROCEDURE — 1101F PT FALLS ASSESS-DOCD LE1/YR: CPT | Mod: CPTII,S$GLB,, | Performed by: STUDENT IN AN ORGANIZED HEALTH CARE EDUCATION/TRAINING PROGRAM

## 2022-08-01 PROCEDURE — 99397 PER PM REEVAL EST PAT 65+ YR: CPT | Mod: GZ,25,S$GLB, | Performed by: STUDENT IN AN ORGANIZED HEALTH CARE EDUCATION/TRAINING PROGRAM

## 2022-08-01 PROCEDURE — 99397 PR PREVENTIVE VISIT,EST,65 & OVER: ICD-10-PCS | Mod: GZ,25,S$GLB, | Performed by: STUDENT IN AN ORGANIZED HEALTH CARE EDUCATION/TRAINING PROGRAM

## 2022-08-01 PROCEDURE — 3008F BODY MASS INDEX DOCD: CPT | Mod: CPTII,S$GLB,, | Performed by: STUDENT IN AN ORGANIZED HEALTH CARE EDUCATION/TRAINING PROGRAM

## 2022-08-01 PROCEDURE — 1159F MED LIST DOCD IN RCRD: CPT | Mod: CPTII,S$GLB,, | Performed by: STUDENT IN AN ORGANIZED HEALTH CARE EDUCATION/TRAINING PROGRAM

## 2022-08-01 PROCEDURE — 90677 PCV20 VACCINE IM: CPT | Mod: S$GLB,,, | Performed by: STUDENT IN AN ORGANIZED HEALTH CARE EDUCATION/TRAINING PROGRAM

## 2022-08-01 PROCEDURE — 1125F PR PAIN SEVERITY QUANTIFIED, PAIN PRESENT: ICD-10-PCS | Mod: CPTII,S$GLB,, | Performed by: STUDENT IN AN ORGANIZED HEALTH CARE EDUCATION/TRAINING PROGRAM

## 2022-08-01 PROCEDURE — 3008F PR BODY MASS INDEX (BMI) DOCUMENTED: ICD-10-PCS | Mod: CPTII,S$GLB,, | Performed by: STUDENT IN AN ORGANIZED HEALTH CARE EDUCATION/TRAINING PROGRAM

## 2022-08-01 PROCEDURE — 3288F FALL RISK ASSESSMENT DOCD: CPT | Mod: CPTII,S$GLB,, | Performed by: STUDENT IN AN ORGANIZED HEALTH CARE EDUCATION/TRAINING PROGRAM

## 2022-08-01 PROCEDURE — 3078F PR MOST RECENT DIASTOLIC BLOOD PRESSURE < 80 MM HG: ICD-10-PCS | Mod: CPTII,S$GLB,, | Performed by: STUDENT IN AN ORGANIZED HEALTH CARE EDUCATION/TRAINING PROGRAM

## 2022-08-01 PROCEDURE — 3074F SYST BP LT 130 MM HG: CPT | Mod: CPTII,S$GLB,, | Performed by: STUDENT IN AN ORGANIZED HEALTH CARE EDUCATION/TRAINING PROGRAM

## 2022-08-01 RX ORDER — AMLODIPINE BESYLATE 5 MG/1
5 TABLET ORAL DAILY
Qty: 90 TABLET | Refills: 1 | Status: SHIPPED | OUTPATIENT
Start: 2022-08-01 | End: 2023-01-27

## 2022-08-01 NOTE — PROGRESS NOTES
Subjective:      Chief Complaint: Annual Exam (Physical)    HPI   Ms. García Mercado is a 71-year-old woman with hypertension (well controlled on low-dose amlodipine) and left shoulder pain (currently enrolled in physical therapy) presenting as a new patient to formally establish primary care.  Of note, was recently evaluated by and Internal Medicine resident who sent a records request to [facility] (particularly for colonoscopy, mammography, and DEXA scan results); no records have been scanned into the basis chart and no labs were gathered at that time despite having labs within the prior year and patient refused vaccinations):      Family, social, surgical Hx reviewed     Health Maintenance:  Due for annual/baseline screening labs, mammography, DEXA, colorectal cancer screening, and vaccinations (only willing to consider pneumococcal)    Past Medical History:   Diagnosis Date    Hypertension      History reviewed. No pertinent surgical history.  Family History   Problem Relation Age of Onset    Cancer Mother     Hypertension Mother     Diabetes Mother      Social History     Socioeconomic History    Marital status:    Tobacco Use    Smoking status: Never Smoker    Smokeless tobacco: Never Used   Substance and Sexual Activity    Alcohol use: Never    Drug use: Never    Sexual activity: Yes     Partners: Male     Review of patient's allergies indicates:   Allergen Reactions    Shellfish containing products Abbi Mercado had no medications administered during this visit.      Review of Systems   Constitutional: Negative for appetite change, chills and fever.   HENT: Negative.    Respiratory: Negative for cough, chest tightness and shortness of breath.    Cardiovascular: Negative for chest pain, palpitations and leg swelling.   Gastrointestinal: Negative for abdominal distention, abdominal pain, blood in stool, constipation, diarrhea, nausea and vomiting.   Endocrine: Negative.    Genitourinary:  Negative for difficulty urinating, dysuria, frequency and hematuria.   Musculoskeletal: Negative.    Integumentary:  Negative.   Neurological: Negative.    Psychiatric/Behavioral: Negative.          Objective:      Vitals:    08/01/22 1404   BP: 104/64   Pulse: 70   Resp: 10   Temp: 97.9 °F (36.6 °C)      Physical Exam  Vitals reviewed.   Constitutional:       General: She is not in acute distress.     Appearance: Normal appearance.   HENT:      Head: Normocephalic and atraumatic.      Comments: Facial features are symmetric      Nose: Nose normal. No congestion or rhinorrhea.      Mouth/Throat:      Mouth: Mucous membranes are moist.      Pharynx: Oropharynx is clear. No oropharyngeal exudate or posterior oropharyngeal erythema.   Eyes:      General: No scleral icterus.     Extraocular Movements: Extraocular movements intact.      Conjunctiva/sclera: Conjunctivae normal.   Cardiovascular:      Rate and Rhythm: Normal rate and regular rhythm.      Pulses: Normal pulses.      Heart sounds: Normal heart sounds.   Pulmonary:      Effort: Pulmonary effort is normal. No respiratory distress.      Breath sounds: Normal breath sounds.   Musculoskeletal:         General: No deformity or signs of injury. Normal range of motion.      Cervical back: Normal range of motion.      Comments: Gait normal    Skin:     General: Skin is warm and dry.      Findings: No rash.   Neurological:      General: No focal deficit present.      Mental Status: She is alert and oriented to person, place, and time. Mental status is at baseline.   Psychiatric:         Mood and Affect: Mood normal.         Behavior: Behavior normal.         Thought Content: Thought content normal.       Current Outpatient Medications on File Prior to Visit   Medication Sig Dispense Refill    [DISCONTINUED] amLODIPine (NORVASC) 5 MG tablet Take 1 tablet (5 mg total) by mouth once daily. 90 tablet 1     No current facility-administered medications on file prior to  visit.         Assessment:       1. Physical exam, annual    2. Hypertension, unspecified type    3. Abnormal fasting glucose    4. Encounter for hepatitis C screening test for low risk patient    5. Screening for HIV (human immunodeficiency virus)    6. Vitamin D deficiency    7. Abnormal colonoscopy        Plan:       Physical exam, annual  -     CBC Auto Differential; Future; Expected date: 08/01/2022  -     Comprehensive Metabolic Panel; Future; Expected date: 08/01/2022  -     Hemoglobin A1C; Future; Expected date: 08/01/2022  -     Hepatitis C Antibody; Future; Expected date: 08/01/2022  -     Lipid Panel; Future; Expected date: 08/01/2022  -     HIV 1/2 Ag/Ab (4th Gen); Future; Expected date: 08/01/2022  -     T4; Future; Expected date: 08/01/2022  -     TSH; Future; Expected date: 08/01/2022  -     Vitamin D; Future; Expected date: 08/01/2022   - baseline/annual screening labs ordered   - patient's unwilling to consider mammographies this time    Hypertension, unspecified type  -     CBC Auto Differential; Future; Expected date: 08/01/2022  -     Comprehensive Metabolic Panel; Future; Expected date: 08/01/2022  -     Lipid Panel; Future; Expected date: 08/01/2022  -     T4; Future; Expected date: 08/01/2022  -     TSH; Future; Expected date: 08/01/2022   - well controlled on low-dose amlodipine; continue current regimen    Abnormal fasting glucose  -     Hemoglobin A1C; Future; Expected date: 08/01/2022    Encounter for hepatitis C screening test for low risk patient  -     Hepatitis C Antibody; Future; Expected date: 08/01/2022    Screening for HIV (human immunodeficiency virus)  -     HIV 1/2 Ag/Ab (4th Gen); Future; Expected date: 08/01/2022    Vitamin D deficiency  -     Vitamin D; Future; Expected date: 08/01/2022    Abnormal colonoscopy   - will facilitate 5 year interval colonoscopy wean due    Other orders  -     Pneumococcal Conjugate Vaccine (20 Valent) (IM)  -     amLODIPine (NORVASC) 5 MG tablet;  Take 1 tablet (5 mg total) by mouth once daily.  Dispense: 90 tablet; Refill: 1

## 2022-08-02 ENCOUNTER — LAB VISIT (OUTPATIENT)
Dept: LAB | Facility: HOSPITAL | Age: 72
End: 2022-08-02
Attending: STUDENT IN AN ORGANIZED HEALTH CARE EDUCATION/TRAINING PROGRAM
Payer: MEDICARE

## 2022-08-02 DIAGNOSIS — Z00.00 PHYSICAL EXAM, ANNUAL: ICD-10-CM

## 2022-08-02 DIAGNOSIS — E55.9 VITAMIN D DEFICIENCY: ICD-10-CM

## 2022-08-02 DIAGNOSIS — R73.01 ABNORMAL FASTING GLUCOSE: ICD-10-CM

## 2022-08-02 DIAGNOSIS — Z11.59 ENCOUNTER FOR HEPATITIS C SCREENING TEST FOR LOW RISK PATIENT: ICD-10-CM

## 2022-08-02 DIAGNOSIS — Z11.4 SCREENING FOR HIV (HUMAN IMMUNODEFICIENCY VIRUS): ICD-10-CM

## 2022-08-02 DIAGNOSIS — I10 HYPERTENSION, UNSPECIFIED TYPE: ICD-10-CM

## 2022-08-02 LAB
25(OH)D3+25(OH)D2 SERPL-MCNC: 43 NG/ML (ref 30–96)
ALBUMIN SERPL BCP-MCNC: 3.9 G/DL (ref 3.5–5.2)
ALP SERPL-CCNC: 95 U/L (ref 55–135)
ALT SERPL W/O P-5'-P-CCNC: 11 U/L (ref 10–44)
ANION GAP SERPL CALC-SCNC: 7 MMOL/L (ref 8–16)
AST SERPL-CCNC: 19 U/L (ref 10–40)
BASOPHILS # BLD AUTO: 0.01 K/UL (ref 0–0.2)
BASOPHILS NFR BLD: 0.2 % (ref 0–1.9)
BILIRUB SERPL-MCNC: 1 MG/DL (ref 0.1–1)
BUN SERPL-MCNC: 17 MG/DL (ref 8–23)
CALCIUM SERPL-MCNC: 9.5 MG/DL (ref 8.7–10.5)
CHLORIDE SERPL-SCNC: 106 MMOL/L (ref 95–110)
CHOLEST SERPL-MCNC: 206 MG/DL (ref 120–199)
CHOLEST/HDLC SERPL: 3.9 {RATIO} (ref 2–5)
CO2 SERPL-SCNC: 27 MMOL/L (ref 23–29)
CREAT SERPL-MCNC: 0.6 MG/DL (ref 0.5–1.4)
DIFFERENTIAL METHOD: ABNORMAL
EOSINOPHIL # BLD AUTO: 0.1 K/UL (ref 0–0.5)
EOSINOPHIL NFR BLD: 2.4 % (ref 0–8)
ERYTHROCYTE [DISTWIDTH] IN BLOOD BY AUTOMATED COUNT: 13.9 % (ref 11.5–14.5)
EST. GFR  (NO RACE VARIABLE): >60 ML/MIN/1.73 M^2
ESTIMATED AVG GLUCOSE: 117 MG/DL (ref 68–131)
GLUCOSE SERPL-MCNC: 91 MG/DL (ref 70–110)
HBA1C MFR BLD: 5.7 % (ref 4–5.6)
HCT VFR BLD AUTO: 41.2 % (ref 37–48.5)
HDLC SERPL-MCNC: 53 MG/DL (ref 40–75)
HDLC SERPL: 25.7 % (ref 20–50)
HGB BLD-MCNC: 13.4 G/DL (ref 12–16)
IMM GRANULOCYTES # BLD AUTO: 0.01 K/UL (ref 0–0.04)
IMM GRANULOCYTES NFR BLD AUTO: 0.2 % (ref 0–0.5)
LDLC SERPL CALC-MCNC: 132.2 MG/DL (ref 63–159)
LYMPHOCYTES # BLD AUTO: 0.9 K/UL (ref 1–4.8)
LYMPHOCYTES NFR BLD: 19.8 % (ref 18–48)
MCH RBC QN AUTO: 30.8 PG (ref 27–31)
MCHC RBC AUTO-ENTMCNC: 32.5 G/DL (ref 32–36)
MCV RBC AUTO: 95 FL (ref 82–98)
MONOCYTES # BLD AUTO: 0.3 K/UL (ref 0.3–1)
MONOCYTES NFR BLD: 6.7 % (ref 4–15)
NEUTROPHILS # BLD AUTO: 3.2 K/UL (ref 1.8–7.7)
NEUTROPHILS NFR BLD: 70.7 % (ref 38–73)
NONHDLC SERPL-MCNC: 153 MG/DL
NRBC BLD-RTO: 0 /100 WBC
PLATELET # BLD AUTO: 175 K/UL (ref 150–450)
PMV BLD AUTO: 11.7 FL (ref 9.2–12.9)
POTASSIUM SERPL-SCNC: 3.9 MMOL/L (ref 3.5–5.1)
PROT SERPL-MCNC: 7.3 G/DL (ref 6–8.4)
RBC # BLD AUTO: 4.35 M/UL (ref 4–5.4)
SODIUM SERPL-SCNC: 140 MMOL/L (ref 136–145)
T4 FREE SERPL-MCNC: 0.94 NG/DL (ref 0.71–1.51)
T4 SERPL-MCNC: 6.8 UG/DL (ref 4.5–11.5)
TRIGL SERPL-MCNC: 104 MG/DL (ref 30–150)
TSH SERPL DL<=0.005 MIU/L-ACNC: 5.17 UIU/ML (ref 0.4–4)
WBC # BLD AUTO: 4.5 K/UL (ref 3.9–12.7)

## 2022-08-02 PROCEDURE — 85025 COMPLETE CBC W/AUTO DIFF WBC: CPT | Performed by: STUDENT IN AN ORGANIZED HEALTH CARE EDUCATION/TRAINING PROGRAM

## 2022-08-02 PROCEDURE — 80061 LIPID PANEL: CPT | Performed by: STUDENT IN AN ORGANIZED HEALTH CARE EDUCATION/TRAINING PROGRAM

## 2022-08-02 PROCEDURE — 36415 COLL VENOUS BLD VENIPUNCTURE: CPT | Mod: PO | Performed by: STUDENT IN AN ORGANIZED HEALTH CARE EDUCATION/TRAINING PROGRAM

## 2022-08-02 PROCEDURE — 84436 ASSAY OF TOTAL THYROXINE: CPT | Mod: XB | Performed by: STUDENT IN AN ORGANIZED HEALTH CARE EDUCATION/TRAINING PROGRAM

## 2022-08-02 PROCEDURE — 83036 HEMOGLOBIN GLYCOSYLATED A1C: CPT | Performed by: STUDENT IN AN ORGANIZED HEALTH CARE EDUCATION/TRAINING PROGRAM

## 2022-08-02 PROCEDURE — 82306 VITAMIN D 25 HYDROXY: CPT | Performed by: STUDENT IN AN ORGANIZED HEALTH CARE EDUCATION/TRAINING PROGRAM

## 2022-08-02 PROCEDURE — 80053 COMPREHEN METABOLIC PANEL: CPT | Performed by: STUDENT IN AN ORGANIZED HEALTH CARE EDUCATION/TRAINING PROGRAM

## 2022-08-02 PROCEDURE — 84443 ASSAY THYROID STIM HORMONE: CPT | Performed by: STUDENT IN AN ORGANIZED HEALTH CARE EDUCATION/TRAINING PROGRAM

## 2022-08-02 PROCEDURE — 86803 HEPATITIS C AB TEST: CPT | Performed by: STUDENT IN AN ORGANIZED HEALTH CARE EDUCATION/TRAINING PROGRAM

## 2022-08-02 PROCEDURE — 84439 ASSAY OF FREE THYROXINE: CPT | Performed by: STUDENT IN AN ORGANIZED HEALTH CARE EDUCATION/TRAINING PROGRAM

## 2022-08-02 PROCEDURE — 87389 HIV-1 AG W/HIV-1&-2 AB AG IA: CPT | Performed by: STUDENT IN AN ORGANIZED HEALTH CARE EDUCATION/TRAINING PROGRAM

## 2022-08-03 LAB
HCV AB SERPL QL IA: NEGATIVE
HIV 1+2 AB+HIV1 P24 AG SERPL QL IA: NEGATIVE

## 2022-10-07 ENCOUNTER — PATIENT OUTREACH (OUTPATIENT)
Dept: ADMINISTRATIVE | Facility: HOSPITAL | Age: 72
End: 2022-10-07
Payer: MEDICARE

## 2022-10-07 ENCOUNTER — PATIENT MESSAGE (OUTPATIENT)
Dept: ADMINISTRATIVE | Facility: HOSPITAL | Age: 72
End: 2022-10-07
Payer: MEDICARE

## 2022-11-14 ENCOUNTER — PATIENT MESSAGE (OUTPATIENT)
Dept: INTERNAL MEDICINE | Facility: CLINIC | Age: 72
End: 2022-11-14
Payer: MEDICARE

## 2023-01-13 ENCOUNTER — PATIENT OUTREACH (OUTPATIENT)
Dept: ADMINISTRATIVE | Facility: HOSPITAL | Age: 73
End: 2023-01-13
Payer: MEDICARE

## 2023-01-13 ENCOUNTER — PATIENT MESSAGE (OUTPATIENT)
Dept: ADMINISTRATIVE | Facility: HOSPITAL | Age: 73
End: 2023-01-13
Payer: MEDICARE

## 2023-01-23 ENCOUNTER — PATIENT OUTREACH (OUTPATIENT)
Dept: ADMINISTRATIVE | Facility: HOSPITAL | Age: 73
End: 2023-01-23
Payer: MEDICARE

## 2023-01-23 NOTE — LETTER
AUTHORIZATION FOR RELEASE OF   CONFIDENTIAL INFORMATION      We are seeing García Mercado, date of birth 1950, in the clinic at [facility] INTERNAL [facility]ICINE. Tessie Rodrigues MD is the patient's PCP. García Mercado has an outstanding lab/procedure at the time we reviewed her chart. In order to help keep her health information updated, she has authorized us to request the following medical record(s):        (  )  MAMMOGRAM                                      (  x)  COLONOSCOPY      (  )  PAP SMEAR                                          (  )  OUTSIDE LAB RESULTS     (  )  DEXA SCAN                                          (  )  EYE EXAM            (  )  FOOT EXAM                                          (  )  ENTIRE RECORD     (  )  OUTSIDE IMMUNIZATIONS                 (  )  _______________         Please fax records to [facility], Tarek C Balamane, MD, [phone]     If you have any questions, please contact [facility] at [phone].           Patient Name: García Mercado  : 1950  Patient Phone #: [phone]

## 2023-04-21 ENCOUNTER — PATIENT MESSAGE (OUTPATIENT)
Dept: ADMINISTRATIVE | Facility: HOSPITAL | Age: 73
End: 2023-04-21
Payer: MEDICARE

## 2023-06-09 ENCOUNTER — PES CALL (OUTPATIENT)
Dept: ADMINISTRATIVE | Facility: CLINIC | Age: 73
End: 2023-06-09
Payer: MEDICARE

## 2023-06-20 ENCOUNTER — OFFICE VISIT (OUTPATIENT)
Dept: OPTOMETRY | Facility: CLINIC | Age: 73
End: 2023-06-20
Payer: MEDICARE

## 2023-06-20 DIAGNOSIS — H52.03 HYPEROPIA OF BOTH EYES WITH ASTIGMATISM AND PRESBYOPIA: ICD-10-CM

## 2023-06-20 DIAGNOSIS — Z13.5 GLAUCOMA SCREENING: ICD-10-CM

## 2023-06-20 DIAGNOSIS — H52.203 HYPEROPIA OF BOTH EYES WITH ASTIGMATISM AND PRESBYOPIA: ICD-10-CM

## 2023-06-20 DIAGNOSIS — H52.4 HYPEROPIA OF BOTH EYES WITH ASTIGMATISM AND PRESBYOPIA: ICD-10-CM

## 2023-06-20 DIAGNOSIS — H25.13 NUCLEAR SCLEROSIS, BILATERAL: Primary | ICD-10-CM

## 2023-06-20 PROCEDURE — 3288F FALL RISK ASSESSMENT DOCD: CPT | Mod: CPTII,S$GLB,, | Performed by: OPTOMETRIST

## 2023-06-20 PROCEDURE — 99999 PR PBB SHADOW E&M-EST. PATIENT-LVL II: CPT | Mod: PBBFAC,,, | Performed by: OPTOMETRIST

## 2023-06-20 PROCEDURE — 3288F PR FALLS RISK ASSESSMENT DOCUMENTED: ICD-10-PCS | Mod: CPTII,S$GLB,, | Performed by: OPTOMETRIST

## 2023-06-20 PROCEDURE — 1159F MED LIST DOCD IN RCRD: CPT | Mod: CPTII,S$GLB,, | Performed by: OPTOMETRIST

## 2023-06-20 PROCEDURE — 1126F PR PAIN SEVERITY QUANTIFIED, NO PAIN PRESENT: ICD-10-PCS | Mod: CPTII,S$GLB,, | Performed by: OPTOMETRIST

## 2023-06-20 PROCEDURE — 92015 DETERMINE REFRACTIVE STATE: CPT | Mod: S$GLB,,, | Performed by: OPTOMETRIST

## 2023-06-20 PROCEDURE — 1101F PR PT FALLS ASSESS DOC 0-1 FALLS W/OUT INJ PAST YR: ICD-10-PCS | Mod: CPTII,S$GLB,, | Performed by: OPTOMETRIST

## 2023-06-20 PROCEDURE — 92004 PR EYE EXAM, NEW PATIENT,COMPREHESV: ICD-10-PCS | Mod: S$GLB,,, | Performed by: OPTOMETRIST

## 2023-06-20 PROCEDURE — 92015 PR REFRACTION: ICD-10-PCS | Mod: S$GLB,,, | Performed by: OPTOMETRIST

## 2023-06-20 PROCEDURE — 1160F PR REVIEW ALL MEDS BY PRESCRIBER/CLIN PHARMACIST DOCUMENTED: ICD-10-PCS | Mod: CPTII,S$GLB,, | Performed by: OPTOMETRIST

## 2023-06-20 PROCEDURE — 99999 PR PBB SHADOW E&M-EST. PATIENT-LVL II: ICD-10-PCS | Mod: PBBFAC,,, | Performed by: OPTOMETRIST

## 2023-06-20 PROCEDURE — 1126F AMNT PAIN NOTED NONE PRSNT: CPT | Mod: CPTII,S$GLB,, | Performed by: OPTOMETRIST

## 2023-06-20 PROCEDURE — 92004 COMPRE OPH EXAM NEW PT 1/>: CPT | Mod: S$GLB,,, | Performed by: OPTOMETRIST

## 2023-06-20 PROCEDURE — 1159F PR MEDICATION LIST DOCUMENTED IN MEDICAL RECORD: ICD-10-PCS | Mod: CPTII,S$GLB,, | Performed by: OPTOMETRIST

## 2023-06-20 PROCEDURE — 1101F PT FALLS ASSESS-DOCD LE1/YR: CPT | Mod: CPTII,S$GLB,, | Performed by: OPTOMETRIST

## 2023-06-20 PROCEDURE — 1160F RVW MEDS BY RX/DR IN RCRD: CPT | Mod: CPTII,S$GLB,, | Performed by: OPTOMETRIST

## 2023-06-20 NOTE — PROGRESS NOTES
HPI    73 Y/o female is here for routine eye exam with C/o needs new Rx glasses   vision is blurry in current Bifocal lens   Pt denies pain and discomfort  No f/f    Eye med: no gtt   Last edited by Amara Tran MA on 6/20/2023  3:17 PM.            Assessment /Plan     For exam results, see Encounter Report.    Nuclear sclerosis, bilateral    Glaucoma screening    Hyperopia of both eyes with astigmatism and presbyopia      Cat OU--pt wishes new Rx    PLAN:    Rtc 1 yr

## 2023-07-26 ENCOUNTER — PATIENT OUTREACH (OUTPATIENT)
Dept: ADMINISTRATIVE | Facility: HOSPITAL | Age: 73
End: 2023-07-26
Payer: MEDICARE

## 2023-07-26 NOTE — PROGRESS NOTES
Health Maintenance Due   Topic Date Due    Mammogram  Never done    DEXA Scan  Never done    Shingles Vaccine (1 of 2) Never done    TETANUS VACCINE  11/20/2007    Hemoglobin A1c (Prediabetes)  08/02/2023     Chart reviewed.   Immunizations: Reconciled  Orders placed: N/A  Upcoming appts to satisfy MAL topics: N/A

## 2023-08-08 NOTE — PROGRESS NOTES
Subjective:      Chief Complaint: Annual Exam    HPI  Ms. García Mercado is a 72-year-old woman with hypertension (amlodipine 5), prediabetes, and left shoulder pain (status post physical therapy last year) presenting annual physical:      Family, social, surgical Hx reviewed     Health Maintenance:  Due for annual screening labs, DEXA, and routine vaccinations (previously deferred).    Past Medical History:   Diagnosis Date    Hypertension      No past surgical history on file.  Family History   Problem Relation Age of Onset    Cancer Mother     Hypertension Mother     Diabetes Mother      Social History     Socioeconomic History    Marital status:    Tobacco Use    Smoking status: Never    Smokeless tobacco: Never   Substance and Sexual Activity    Alcohol use: Never    Drug use: Never    Sexual activity: Yes     Partners: Male     Review of patient's allergies indicates:   Allergen Reactions    Shellfish containing products Abbi Mercado had no medications administered during this visit.    Review of Systems   Constitutional:  Negative for activity change and unexpected weight change.   HENT:  Negative for hearing loss, rhinorrhea and trouble swallowing.    Eyes:  Negative for discharge and visual disturbance.   Respiratory:  Negative for chest tightness and wheezing.    Cardiovascular:  Negative for chest pain and palpitations.   Gastrointestinal:  Negative for blood in stool, constipation, diarrhea and vomiting.   Endocrine: Negative for polydipsia and polyuria.   Genitourinary:  Negative for difficulty urinating, dysuria, hematuria and menstrual problem.   Musculoskeletal:  Negative for arthralgias, joint swelling and neck pain.   Neurological:  Negative for weakness and headaches.   Psychiatric/Behavioral:  Negative for confusion and dysphoric mood.          Objective:      Vitals:    08/09/23 1008   BP: 124/66   Pulse: 94   Resp: 16   Temp: 97.9 °F (36.6 °C)      Physical Exam  Vitals reviewed.    Constitutional:       General: She is not in acute distress.     Appearance: Normal appearance.   HENT:      Head: Normocephalic and atraumatic.      Comments: Facial features are symmetric      Nose: Nose normal. No congestion or rhinorrhea.      Mouth/Throat:      Mouth: Mucous membranes are moist.      Pharynx: Oropharynx is clear. No oropharyngeal exudate or posterior oropharyngeal erythema.   Eyes:      General: No scleral icterus.     Extraocular Movements: Extraocular movements intact.      Conjunctiva/sclera: Conjunctivae normal.   Cardiovascular:      Rate and Rhythm: Normal rate and regular rhythm.      Pulses: Normal pulses.      Heart sounds: Normal heart sounds.   Pulmonary:      Effort: Pulmonary effort is normal. No respiratory distress.      Breath sounds: Normal breath sounds.   Musculoskeletal:         General: No deformity or signs of injury. Normal range of motion.      Cervical back: Normal range of motion.      Comments: Gait normal    Skin:     General: Skin is warm and dry.      Findings: No rash.   Neurological:      General: No focal deficit present.      Mental Status: She is alert and oriented to person, place, and time. Mental status is at baseline.   Psychiatric:         Mood and Affect: Mood normal.         Behavior: Behavior normal.         Thought Content: Thought content normal.       Current Outpatient Medications on File Prior to Visit   Medication Sig Dispense Refill    amLODIPine (NORVASC) 5 MG tablet TAKE 1 TABLET(5 MG) BY MOUTH EVERY DAY 90 tablet 0     No current facility-administered medications on file prior to visit.         Assessment:       1. Physical exam, annual    2. Screening for colorectal cancer    3. Encounter for screening mammogram for breast cancer    4. Hypertension, unspecified type    5. Vitamin D deficiency    6. Prediabetes    7. Osteoporosis, unspecified osteoporosis type, unspecified pathological fracture presence        Plan:       Physical exam,  annual  -     Comprehensive Metabolic Panel; Future; Expected date: 08/09/2023  -     CBC Auto Differential; Future; Expected date: 08/09/2023  -     Hemoglobin A1C; Future; Expected date: 08/09/2023  -     Lipid Panel; Future; Expected date: 08/09/2023  -     T4; Future; Expected date: 08/09/2023  -     TSH; Future; Expected date: 08/09/2023  -     Vitamin D; Future; Expected date: 08/09/2023   - annual screening labs ordered     Screening for colorectal cancer  -     Fecal Immunochemical Test (iFOBT); Future; Expected date: 08/09/2023   - reports colonoscopy via outside institution approximally 5 years ago (on 5 year interval), but is disinterested in repeat formal colonoscopy at this time.     - fit test provided     Encounter for screening mammogram for breast cancer  -     Mammo Digital Screening Bilat w/ Joshua; Future; Expected date: 08/09/2023   - reluctantly willing after our conversation to repeat mammography (last approximally 8 years ago)    Hypertension, unspecified type  -     Comprehensive Metabolic Panel; Future; Expected date: 08/09/2023  -     CBC Auto Differential; Future; Expected date: 08/09/2023  -     Lipid Panel; Future; Expected date: 08/09/2023  -     T4; Future; Expected date: 08/09/2023  -     TSH; Future; Expected date: 08/09/2023   - normotensive; no changes made     Vitamin D deficiency  -     Vitamin D; Future; Expected date: 08/09/2023    Prediabetes  -     Hemoglobin A1C; Future; Expected date: 08/09/2023    Osteoporosis, unspecified osteoporosis type, unspecified pathological fracture presence  -     DXA Bone Density Appendicular Skeleton; Future; Expected date: 08/09/2023

## 2023-08-09 ENCOUNTER — OFFICE VISIT (OUTPATIENT)
Dept: INTERNAL MEDICINE | Facility: CLINIC | Age: 73
End: 2023-08-09
Payer: MEDICARE

## 2023-08-09 VITALS
OXYGEN SATURATION: 97 % | HEIGHT: 62 IN | WEIGHT: 116.88 LBS | TEMPERATURE: 98 F | DIASTOLIC BLOOD PRESSURE: 66 MMHG | HEART RATE: 94 BPM | SYSTOLIC BLOOD PRESSURE: 124 MMHG | BODY MASS INDEX: 21.51 KG/M2 | RESPIRATION RATE: 16 BRPM

## 2023-08-09 DIAGNOSIS — M81.0 OSTEOPOROSIS, UNSPECIFIED OSTEOPOROSIS TYPE, UNSPECIFIED PATHOLOGICAL FRACTURE PRESENCE: ICD-10-CM

## 2023-08-09 DIAGNOSIS — Z12.11 SCREENING FOR COLORECTAL CANCER: ICD-10-CM

## 2023-08-09 DIAGNOSIS — Z00.00 PHYSICAL EXAM, ANNUAL: Primary | ICD-10-CM

## 2023-08-09 DIAGNOSIS — R73.03 PREDIABETES: ICD-10-CM

## 2023-08-09 DIAGNOSIS — I10 HYPERTENSION, UNSPECIFIED TYPE: ICD-10-CM

## 2023-08-09 DIAGNOSIS — Z12.12 SCREENING FOR COLORECTAL CANCER: ICD-10-CM

## 2023-08-09 DIAGNOSIS — E55.9 VITAMIN D DEFICIENCY: ICD-10-CM

## 2023-08-09 DIAGNOSIS — Z12.31 ENCOUNTER FOR SCREENING MAMMOGRAM FOR BREAST CANCER: ICD-10-CM

## 2023-08-09 PROCEDURE — 3008F PR BODY MASS INDEX (BMI) DOCUMENTED: ICD-10-PCS | Mod: CPTII,S$GLB,, | Performed by: STUDENT IN AN ORGANIZED HEALTH CARE EDUCATION/TRAINING PROGRAM

## 2023-08-09 PROCEDURE — 3074F SYST BP LT 130 MM HG: CPT | Mod: CPTII,S$GLB,, | Performed by: STUDENT IN AN ORGANIZED HEALTH CARE EDUCATION/TRAINING PROGRAM

## 2023-08-09 PROCEDURE — 99214 OFFICE O/P EST MOD 30 MIN: CPT | Mod: S$GLB,,, | Performed by: STUDENT IN AN ORGANIZED HEALTH CARE EDUCATION/TRAINING PROGRAM

## 2023-08-09 PROCEDURE — 1126F PR PAIN SEVERITY QUANTIFIED, NO PAIN PRESENT: ICD-10-PCS | Mod: CPTII,S$GLB,, | Performed by: STUDENT IN AN ORGANIZED HEALTH CARE EDUCATION/TRAINING PROGRAM

## 2023-08-09 PROCEDURE — 1159F PR MEDICATION LIST DOCUMENTED IN MEDICAL RECORD: ICD-10-PCS | Mod: CPTII,S$GLB,, | Performed by: STUDENT IN AN ORGANIZED HEALTH CARE EDUCATION/TRAINING PROGRAM

## 2023-08-09 PROCEDURE — 99999 PR PBB SHADOW E&M-EST. PATIENT-LVL IV: ICD-10-PCS | Mod: PBBFAC,,, | Performed by: STUDENT IN AN ORGANIZED HEALTH CARE EDUCATION/TRAINING PROGRAM

## 2023-08-09 PROCEDURE — 1126F AMNT PAIN NOTED NONE PRSNT: CPT | Mod: CPTII,S$GLB,, | Performed by: STUDENT IN AN ORGANIZED HEALTH CARE EDUCATION/TRAINING PROGRAM

## 2023-08-09 PROCEDURE — 3074F PR MOST RECENT SYSTOLIC BLOOD PRESSURE < 130 MM HG: ICD-10-PCS | Mod: CPTII,S$GLB,, | Performed by: STUDENT IN AN ORGANIZED HEALTH CARE EDUCATION/TRAINING PROGRAM

## 2023-08-09 PROCEDURE — 3078F DIAST BP <80 MM HG: CPT | Mod: CPTII,S$GLB,, | Performed by: STUDENT IN AN ORGANIZED HEALTH CARE EDUCATION/TRAINING PROGRAM

## 2023-08-09 PROCEDURE — 99999 PR PBB SHADOW E&M-EST. PATIENT-LVL IV: CPT | Mod: PBBFAC,,, | Performed by: STUDENT IN AN ORGANIZED HEALTH CARE EDUCATION/TRAINING PROGRAM

## 2023-08-09 PROCEDURE — 1101F PT FALLS ASSESS-DOCD LE1/YR: CPT | Mod: CPTII,S$GLB,, | Performed by: STUDENT IN AN ORGANIZED HEALTH CARE EDUCATION/TRAINING PROGRAM

## 2023-08-09 PROCEDURE — 99214 PR OFFICE/OUTPT VISIT, EST, LEVL IV, 30-39 MIN: ICD-10-PCS | Mod: S$GLB,,, | Performed by: STUDENT IN AN ORGANIZED HEALTH CARE EDUCATION/TRAINING PROGRAM

## 2023-08-09 PROCEDURE — 3008F BODY MASS INDEX DOCD: CPT | Mod: CPTII,S$GLB,, | Performed by: STUDENT IN AN ORGANIZED HEALTH CARE EDUCATION/TRAINING PROGRAM

## 2023-08-09 PROCEDURE — 3288F PR FALLS RISK ASSESSMENT DOCUMENTED: ICD-10-PCS | Mod: CPTII,S$GLB,, | Performed by: STUDENT IN AN ORGANIZED HEALTH CARE EDUCATION/TRAINING PROGRAM

## 2023-08-09 PROCEDURE — 1159F MED LIST DOCD IN RCRD: CPT | Mod: CPTII,S$GLB,, | Performed by: STUDENT IN AN ORGANIZED HEALTH CARE EDUCATION/TRAINING PROGRAM

## 2023-08-09 PROCEDURE — 3078F PR MOST RECENT DIASTOLIC BLOOD PRESSURE < 80 MM HG: ICD-10-PCS | Mod: CPTII,S$GLB,, | Performed by: STUDENT IN AN ORGANIZED HEALTH CARE EDUCATION/TRAINING PROGRAM

## 2023-08-09 PROCEDURE — 1101F PR PT FALLS ASSESS DOC 0-1 FALLS W/OUT INJ PAST YR: ICD-10-PCS | Mod: CPTII,S$GLB,, | Performed by: STUDENT IN AN ORGANIZED HEALTH CARE EDUCATION/TRAINING PROGRAM

## 2023-08-09 PROCEDURE — 3288F FALL RISK ASSESSMENT DOCD: CPT | Mod: CPTII,S$GLB,, | Performed by: STUDENT IN AN ORGANIZED HEALTH CARE EDUCATION/TRAINING PROGRAM

## 2023-08-16 ENCOUNTER — LAB VISIT (OUTPATIENT)
Dept: LAB | Facility: HOSPITAL | Age: 73
End: 2023-08-16
Attending: STUDENT IN AN ORGANIZED HEALTH CARE EDUCATION/TRAINING PROGRAM
Payer: MEDICARE

## 2023-08-16 DIAGNOSIS — R73.03 PREDIABETES: ICD-10-CM

## 2023-08-16 DIAGNOSIS — Z00.00 PHYSICAL EXAM, ANNUAL: ICD-10-CM

## 2023-08-16 DIAGNOSIS — I10 HYPERTENSION, UNSPECIFIED TYPE: ICD-10-CM

## 2023-08-16 DIAGNOSIS — E55.9 VITAMIN D DEFICIENCY: ICD-10-CM

## 2023-08-16 LAB
25(OH)D3+25(OH)D2 SERPL-MCNC: 41 NG/ML (ref 30–96)
ALBUMIN SERPL BCP-MCNC: 3.8 G/DL (ref 3.5–5.2)
ALP SERPL-CCNC: 119 U/L (ref 55–135)
ALT SERPL W/O P-5'-P-CCNC: 25 U/L (ref 10–44)
ANION GAP SERPL CALC-SCNC: 11 MMOL/L (ref 8–16)
AST SERPL-CCNC: 27 U/L (ref 10–40)
BASOPHILS # BLD AUTO: 0.02 K/UL (ref 0–0.2)
BASOPHILS NFR BLD: 0.6 % (ref 0–1.9)
BILIRUB SERPL-MCNC: 0.7 MG/DL (ref 0.1–1)
BUN SERPL-MCNC: 13 MG/DL (ref 8–23)
CALCIUM SERPL-MCNC: 9.1 MG/DL (ref 8.7–10.5)
CHLORIDE SERPL-SCNC: 106 MMOL/L (ref 95–110)
CHOLEST SERPL-MCNC: 229 MG/DL (ref 120–199)
CHOLEST/HDLC SERPL: 4.3 {RATIO} (ref 2–5)
CO2 SERPL-SCNC: 23 MMOL/L (ref 23–29)
CREAT SERPL-MCNC: 0.6 MG/DL (ref 0.5–1.4)
DIFFERENTIAL METHOD: ABNORMAL
EOSINOPHIL # BLD AUTO: 0.1 K/UL (ref 0–0.5)
EOSINOPHIL NFR BLD: 3.9 % (ref 0–8)
ERYTHROCYTE [DISTWIDTH] IN BLOOD BY AUTOMATED COUNT: 14 % (ref 11.5–14.5)
EST. GFR  (NO RACE VARIABLE): >60 ML/MIN/1.73 M^2
ESTIMATED AVG GLUCOSE: 117 MG/DL (ref 68–131)
GLUCOSE SERPL-MCNC: 95 MG/DL (ref 70–110)
HBA1C MFR BLD: 5.7 % (ref 4–5.6)
HCT VFR BLD AUTO: 41.9 % (ref 37–48.5)
HDLC SERPL-MCNC: 53 MG/DL (ref 40–75)
HDLC SERPL: 23.1 % (ref 20–50)
HGB BLD-MCNC: 13.4 G/DL (ref 12–16)
IMM GRANULOCYTES # BLD AUTO: 0.01 K/UL (ref 0–0.04)
IMM GRANULOCYTES NFR BLD AUTO: 0.3 % (ref 0–0.5)
LDLC SERPL CALC-MCNC: 146.6 MG/DL (ref 63–159)
LYMPHOCYTES # BLD AUTO: 0.9 K/UL (ref 1–4.8)
LYMPHOCYTES NFR BLD: 25.9 % (ref 18–48)
MCH RBC QN AUTO: 29.3 PG (ref 27–31)
MCHC RBC AUTO-ENTMCNC: 32 G/DL (ref 32–36)
MCV RBC AUTO: 92 FL (ref 82–98)
MONOCYTES # BLD AUTO: 0.2 K/UL (ref 0.3–1)
MONOCYTES NFR BLD: 6.1 % (ref 4–15)
NEUTROPHILS # BLD AUTO: 2.3 K/UL (ref 1.8–7.7)
NEUTROPHILS NFR BLD: 63.2 % (ref 38–73)
NONHDLC SERPL-MCNC: 176 MG/DL
NRBC BLD-RTO: 0 /100 WBC
PLATELET # BLD AUTO: 170 K/UL (ref 150–450)
PMV BLD AUTO: 10.8 FL (ref 9.2–12.9)
POTASSIUM SERPL-SCNC: 3.6 MMOL/L (ref 3.5–5.1)
PROT SERPL-MCNC: 7.1 G/DL (ref 6–8.4)
RBC # BLD AUTO: 4.58 M/UL (ref 4–5.4)
SODIUM SERPL-SCNC: 140 MMOL/L (ref 136–145)
T4 FREE SERPL-MCNC: 0.93 NG/DL (ref 0.71–1.51)
T4 SERPL-MCNC: 6.8 UG/DL (ref 4.5–11.5)
TRIGL SERPL-MCNC: 147 MG/DL (ref 30–150)
TSH SERPL DL<=0.005 MIU/L-ACNC: 5.92 UIU/ML (ref 0.4–4)
WBC # BLD AUTO: 3.59 K/UL (ref 3.9–12.7)

## 2023-08-16 PROCEDURE — 83036 HEMOGLOBIN GLYCOSYLATED A1C: CPT | Performed by: STUDENT IN AN ORGANIZED HEALTH CARE EDUCATION/TRAINING PROGRAM

## 2023-08-16 PROCEDURE — 36415 COLL VENOUS BLD VENIPUNCTURE: CPT | Mod: PO | Performed by: STUDENT IN AN ORGANIZED HEALTH CARE EDUCATION/TRAINING PROGRAM

## 2023-08-16 PROCEDURE — 82306 VITAMIN D 25 HYDROXY: CPT | Performed by: STUDENT IN AN ORGANIZED HEALTH CARE EDUCATION/TRAINING PROGRAM

## 2023-08-16 PROCEDURE — 85025 COMPLETE CBC W/AUTO DIFF WBC: CPT | Performed by: STUDENT IN AN ORGANIZED HEALTH CARE EDUCATION/TRAINING PROGRAM

## 2023-08-16 PROCEDURE — 84436 ASSAY OF TOTAL THYROXINE: CPT | Performed by: STUDENT IN AN ORGANIZED HEALTH CARE EDUCATION/TRAINING PROGRAM

## 2023-08-16 PROCEDURE — 84439 ASSAY OF FREE THYROXINE: CPT | Performed by: STUDENT IN AN ORGANIZED HEALTH CARE EDUCATION/TRAINING PROGRAM

## 2023-08-16 PROCEDURE — 80061 LIPID PANEL: CPT | Performed by: STUDENT IN AN ORGANIZED HEALTH CARE EDUCATION/TRAINING PROGRAM

## 2023-08-16 PROCEDURE — 80053 COMPREHEN METABOLIC PANEL: CPT | Performed by: STUDENT IN AN ORGANIZED HEALTH CARE EDUCATION/TRAINING PROGRAM

## 2023-08-16 PROCEDURE — 84443 ASSAY THYROID STIM HORMONE: CPT | Performed by: STUDENT IN AN ORGANIZED HEALTH CARE EDUCATION/TRAINING PROGRAM

## 2023-08-28 ENCOUNTER — HOSPITAL ENCOUNTER (OUTPATIENT)
Dept: RADIOLOGY | Facility: HOSPITAL | Age: 73
Discharge: HOME | End: 2023-08-28
Attending: STUDENT IN AN ORGANIZED HEALTH CARE EDUCATION/TRAINING PROGRAM
Payer: MEDICARE

## 2023-08-28 DIAGNOSIS — Z12.31 ENCOUNTER FOR SCREENING MAMMOGRAM FOR BREAST CANCER: ICD-10-CM

## 2023-08-28 PROCEDURE — 77067 SCR MAMMO BI INCL CAD: CPT | Mod: 26,,, | Performed by: RADIOLOGY

## 2023-08-28 PROCEDURE — 77067 MAMMO DIGITAL SCREENING BILAT WITH TOMO: ICD-10-PCS | Mod: 26,,, | Performed by: RADIOLOGY

## 2023-08-28 PROCEDURE — 77063 MAMMO DIGITAL SCREENING BILAT WITH TOMO: ICD-10-PCS | Mod: 26,,, | Performed by: RADIOLOGY

## 2023-08-28 PROCEDURE — 77067 SCR MAMMO BI INCL CAD: CPT | Mod: TC,PO

## 2023-08-28 PROCEDURE — 77063 BREAST TOMOSYNTHESIS BI: CPT | Mod: 26,,, | Performed by: RADIOLOGY

## 2023-08-29 RX ORDER — ALENDRONATE SODIUM 70 MG/1
70 TABLET ORAL
Qty: 12 TABLET | Refills: 3 | Status: SHIPPED | OUTPATIENT
Start: 2023-08-29 | End: 2024-08-28

## 2023-09-08 ENCOUNTER — PATIENT MESSAGE (OUTPATIENT)
Dept: INTERNAL MEDICINE | Facility: CLINIC | Age: 73
End: 2023-09-08
Payer: MEDICARE

## 2023-11-26 ENCOUNTER — OFFICE VISIT (OUTPATIENT)
Dept: URGENT CARE | Facility: CLINIC | Age: 73
End: 2023-11-26
Payer: MEDICARE

## 2023-11-26 VITALS
RESPIRATION RATE: 16 BRPM | TEMPERATURE: 99 F | WEIGHT: 116 LBS | DIASTOLIC BLOOD PRESSURE: 83 MMHG | SYSTOLIC BLOOD PRESSURE: 142 MMHG | HEIGHT: 62 IN | OXYGEN SATURATION: 98 % | HEART RATE: 117 BPM | BODY MASS INDEX: 21.35 KG/M2

## 2023-11-26 DIAGNOSIS — R05.9 COUGH, UNSPECIFIED TYPE: Primary | ICD-10-CM

## 2023-11-26 DIAGNOSIS — U07.1 COVID: ICD-10-CM

## 2023-11-26 DIAGNOSIS — U07.1 COVID-19 VIRUS DETECTED: ICD-10-CM

## 2023-11-26 LAB
CTP QC/QA: YES
SARS-COV-2 AG RESP QL IA.RAPID: POSITIVE

## 2023-11-26 PROCEDURE — 99204 OFFICE O/P NEW MOD 45 MIN: CPT | Mod: S$GLB,,, | Performed by: FAMILY MEDICINE

## 2023-11-26 PROCEDURE — 99204 PR OFFICE/OUTPT VISIT, NEW, LEVL IV, 45-59 MIN: ICD-10-PCS | Mod: S$GLB,,, | Performed by: FAMILY MEDICINE

## 2023-11-26 PROCEDURE — 87811 SARS CORONAVIRUS 2 ANTIGEN POCT, MANUAL READ: ICD-10-PCS | Mod: QW,S$GLB,, | Performed by: FAMILY MEDICINE

## 2023-11-26 PROCEDURE — 87811 SARS-COV-2 COVID19 W/OPTIC: CPT | Mod: QW,S$GLB,, | Performed by: FAMILY MEDICINE

## 2023-11-26 NOTE — PROGRESS NOTES
"Subjective:      Patient ID: García Mercado is a 73 y.o. female.    Vitals:  height is 5' 2" (1.575 m) and weight is 52.6 kg (116 lb). Her oral temperature is 98.8 °F (37.1 °C). Her blood pressure is 142/83 (abnormal) and her pulse is 117 (abnormal). Her respiration is 16 and oxygen saturation is 98%.     Chief Complaint: Cough    This is a 73 y.o. female who presents today with a chief complaint of cough fever and headaches that started 2 days ago. Pt stated that her  is positive for covid.       Cough  This is a new problem. The current episode started in the past 7 days. The problem has been unchanged. The problem occurs constantly. The cough is Non-productive. Associated symptoms include a fever, headaches and a sore throat. Pertinent negatives include no chest pain, chills, ear congestion, ear pain, heartburn, hemoptysis, myalgias, nasal congestion, postnasal drip, rash, rhinorrhea, shortness of breath, sweats, weight loss or wheezing. Treatments tried: tylenol-pm. There is no history of asthma, bronchiectasis, bronchitis, COPD, emphysema, environmental allergies or pneumonia.     Constitution: Positive for fever. Negative for chills.   HENT:  Positive for sore throat. Negative for ear pain and postnasal drip.    Cardiovascular:  Negative for chest pain.   Respiratory:  Positive for cough. Negative for bloody sputum, shortness of breath and wheezing.    Gastrointestinal:  Negative for heartburn.   Musculoskeletal:  Negative for muscle ache.   Skin:  Negative for rash.   Allergic/Immunologic: Negative for environmental allergies.   Neurological:  Positive for headaches.      Objective:     Physical Exam    Assessment:     1. Cough, unspecified type    2. COVID        Plan:       Cough, unspecified type  -     SARS Coronavirus 2 Antigen, POCT Manual Read    COVID  -     nirmatrelvir-ritonavir 300 mg (150 mg x 2)-100 mg copackaged tablets (EUA); Take 3 tablets by mouth 2 (two) times daily for 5 days. Each dose " contains 2 nirmatrelvir (pink tablets) and 1 ritonavir (white tablet). Take all 3 tablets together  Dispense: 30 tablet; Refill: 0        Thank you for choosing [facility]!     Our goal in the Urgent Care is to always provide outstanding medical care. You may receive a survey by mail or e-mail in the next week regarding your experience today. We would greatly appreciate you completing and returning the survey. Your feedback provides us with a way to recognize our staff who provide very good care, and it helps us learn how to improve when your experience was below our aspiration of excellence.       We appreciate you trusting us with your medical care. We hope you feel better soon. We will be happy to take care of you for all of your future medical needs.  You must understand that you've received an Urgent Care treatment only and that you may be released before all your medical problems are known or treated. You, the patient, will arrange for follow up care as instructed.  Follow up with your PCP or specialty clinic as directed in the next 1-2 weeks if not improved or as needed.  You can call [phone] to schedule an appointment with the appropriate provider.  Another option is to follow up with [facility] ([email or web address]) virtually for quick simple medical advice.  If your condition worsens we recommend that you receive another evaluation at the emergency room immediately or contact your primary medical clinics after hours call service to discuss your concerns.  Please return here or go to the Emergency Department for any concerns or worsening of condition.      *If you were prescribed a narcotic or controlled medication, do not drive or operate heavy equipment or machinery while taking these medications.

## 2023-12-04 ENCOUNTER — NURSE TRIAGE (OUTPATIENT)
Dept: ADMINISTRATIVE | Facility: CLINIC | Age: 73
End: 2023-12-04
Payer: MEDICARE

## 2023-12-04 NOTE — TELEPHONE ENCOUNTER
Patient tested covid+ 8 days ago Symptoms started on 11/25. Patient completed a course of Paxlovid. States that her cough worsened last night. Most concerned about a productive cough with white sputum. Advised per protocol to be seen in the office today. Patient states that she can not be seen today.  Advised the patient to call back with any further questions or if symptoms worsen.      Reason for Disposition   PERSISTING SYMPTOMS OF COVID-19 and symptoms WORSE    Additional Information   Negative: SEVERE difficulty breathing (e.g., struggling for each breath, speaks in single words)   Negative: SEVERE weakness (e.g., can't stand or can barely walk) and new-onset or WORSE   Negative: Difficult to awaken or acting confused (e.g., disoriented, slurred speech)   Negative: Bluish (or gray) lips or face now   Negative: Sounds like a life-threatening emergency to the triager   Negative: MODERATE difficulty breathing (e.g., speaks in phrases, SOB even at rest, pulse 100-120) and new-onset or WORSE   Negative: MODERATE difficulty breathing and oxygen level (e.g., pulse oximetry) 91 to 94%   Negative: Oxygen level (e.g., pulse oximetry) 90% or lower   Negative: MODERATE difficulty breathing (e.g., speaks in phrases, SOB even at rest, pulse 100-120)   Negative: Drinking very little and dehydration suspected (e.g., no urine > 12 hours, very dry mouth, very lightheaded)   Negative: Patient sounds very sick or weak to the triager   Negative: MILD difficulty breathing (e.g., minimal/no SOB at rest, SOB with walking, pulse <100) and new-onset   Negative: Oxygen level (e.g., pulse oximetry) 91 to 94%   Negative: PERSISTING SYMPTOMS OF COVID-19 and NEW symptom and could be serious   Negative: Caller has URGENT question and triager unable to answer question    Protocols used: Coronavirus (COVID-19) Persisting Symptoms Follow-up [facility]

## 2024-08-24 NOTE — TELEPHONE ENCOUNTER
Refill Routing Note   Medication(s) are not appropriate for processing by [facility] for the following reason(s):        Required vitals abnormal    [facility] action(s):  Defer   Requires appointment : Yes     Requires labs : Yes             Appointments  past 12m or future 3m with PCP    Date Provider   Last Visit   8/9/2023 Tessie Rodrigues MD   Next Visit   Visit date not found Tessie Rodrigues MD   ED visits in past 90 days: 0        Note composed:3:26 PM 08/24/2024

## 2024-08-24 NOTE — TELEPHONE ENCOUNTER
Care Due:                  Date            Visit Type   Department     Provider  --------------------------------------------------------------------------------                                MYCHART                              ANNUAL                              CHECKUP/PHY  MET INTERNAL  Last Visit: 08-      [facility]       Tessie Rodrigues  Next Visit: None Scheduled  None         None Found                                                            Last  Test          Frequency    Reason                     Performed    Due Date  --------------------------------------------------------------------------------    Office Visit  12 months..  alendronate..............  08- 08-    CMP.........  12 months..  alendronate..............  08-   08-    Mg Level....  12 months..  alendronate..............  Not Found    Overdue    Phosphate...  12 months..  alendronate..............  Not Found    Overdue    [employer] Embedded Care Due Messages. Reference number: [id number].   8/24/2024 3:37:39 AM CDT

## 2024-08-26 RX ORDER — AMLODIPINE BESYLATE 5 MG/1
TABLET ORAL
Qty: 90 TABLET | Refills: 0 | Status: SHIPPED | OUTPATIENT
Start: 2024-08-26